# Patient Record
Sex: FEMALE | Race: AMERICAN INDIAN OR ALASKA NATIVE | NOT HISPANIC OR LATINO
[De-identification: names, ages, dates, MRNs, and addresses within clinical notes are randomized per-mention and may not be internally consistent; named-entity substitution may affect disease eponyms.]

---

## 2017-03-10 PROBLEM — Z00.00 ENCOUNTER FOR PREVENTIVE HEALTH EXAMINATION: Status: ACTIVE | Noted: 2017-03-10

## 2017-05-31 ENCOUNTER — APPOINTMENT (OUTPATIENT)
Dept: OBGYN | Facility: CLINIC | Age: 42
End: 2017-05-31

## 2017-05-31 VITALS — HEIGHT: 64 IN | WEIGHT: 135 LBS | BODY MASS INDEX: 23.05 KG/M2

## 2017-07-17 ENCOUNTER — APPOINTMENT (OUTPATIENT)
Dept: OBGYN | Facility: CLINIC | Age: 42
End: 2017-07-17
Payer: COMMERCIAL

## 2017-07-17 VITALS
BODY MASS INDEX: 22.36 KG/M2 | WEIGHT: 131 LBS | DIASTOLIC BLOOD PRESSURE: 74 MMHG | HEART RATE: 61 BPM | HEIGHT: 64 IN | SYSTOLIC BLOOD PRESSURE: 109 MMHG

## 2017-07-17 PROCEDURE — 99213 OFFICE O/P EST LOW 20 MIN: CPT

## 2017-07-17 PROCEDURE — 76830 TRANSVAGINAL US NON-OB: CPT

## 2017-07-17 PROCEDURE — 76857 US EXAM PELVIC LIMITED: CPT

## 2017-07-18 ENCOUNTER — APPOINTMENT (OUTPATIENT)
Dept: OBGYN | Facility: CLINIC | Age: 42
End: 2017-07-18

## 2017-07-19 ENCOUNTER — APPOINTMENT (OUTPATIENT)
Dept: OBGYN | Facility: CLINIC | Age: 42
End: 2017-07-19

## 2017-07-19 VITALS
DIASTOLIC BLOOD PRESSURE: 81 MMHG | BODY MASS INDEX: 22.53 KG/M2 | HEIGHT: 64 IN | WEIGHT: 132 LBS | HEART RATE: 72 BPM | SYSTOLIC BLOOD PRESSURE: 121 MMHG

## 2017-08-12 LAB
CORE LAB BIOPSY: NORMAL
FSH SERPL-MCNC: 3.8 IU/L
HCG SERPL-MCNC: <1 MIU/ML
T4 FREE SERPL-MCNC: 1.5 NG/DL
TSH SERPL-ACNC: 1.7 UIU/ML

## 2017-10-22 ENCOUNTER — OTHER (OUTPATIENT)
Age: 42
End: 2017-10-22

## 2017-10-30 ENCOUNTER — APPOINTMENT (OUTPATIENT)
Dept: OBGYN | Facility: CLINIC | Age: 42
End: 2017-10-30

## 2017-11-02 ENCOUNTER — OTHER (OUTPATIENT)
Age: 42
End: 2017-11-02

## 2017-12-16 LAB
FSH SERPL-MCNC: 4.9 IU/L
HCG SERPL-MCNC: <1 MIU/ML

## 2018-03-28 ENCOUNTER — APPOINTMENT (OUTPATIENT)
Dept: CT IMAGING | Facility: CLINIC | Age: 43
End: 2018-03-28

## 2018-03-28 ENCOUNTER — RECORD ABSTRACTING (OUTPATIENT)
Age: 43
End: 2018-03-28

## 2018-04-02 ENCOUNTER — APPOINTMENT (OUTPATIENT)
Dept: CT IMAGING | Facility: CLINIC | Age: 43
End: 2018-04-02

## 2018-04-03 ENCOUNTER — APPOINTMENT (OUTPATIENT)
Dept: GYNECOLOGIC ONCOLOGY | Facility: CLINIC | Age: 43
End: 2018-04-03
Payer: COMMERCIAL

## 2018-04-03 VITALS
DIASTOLIC BLOOD PRESSURE: 92 MMHG | WEIGHT: 135 LBS | OXYGEN SATURATION: 100 % | BODY MASS INDEX: 23.92 KG/M2 | SYSTOLIC BLOOD PRESSURE: 163 MMHG | HEIGHT: 63 IN | HEART RATE: 81 BPM

## 2018-04-03 DIAGNOSIS — N93.9 ABNORMAL UTERINE AND VAGINAL BLEEDING, UNSPECIFIED: ICD-10-CM

## 2018-04-03 DIAGNOSIS — N92.0 EXCESSIVE AND FREQUENT MENSTRUATION WITH REGULAR CYCLE: ICD-10-CM

## 2018-04-03 DIAGNOSIS — R97.1 ELEVATED CANCER ANTIGEN 125 [CA 125]: ICD-10-CM

## 2018-04-03 DIAGNOSIS — Z87.42 PERSONAL HISTORY OF OTHER DISEASES OF THE FEMALE GENITAL TRACT: ICD-10-CM

## 2018-04-03 LAB
HCG UR QL: NEGATIVE
QUALITY CONTROL: YES

## 2018-04-03 PROCEDURE — 99205 OFFICE O/P NEW HI 60 MIN: CPT | Mod: 25

## 2018-04-03 PROCEDURE — 81025 URINE PREGNANCY TEST: CPT

## 2018-04-03 PROCEDURE — 58100 BIOPSY OF UTERUS LINING: CPT

## 2018-04-03 RX ORDER — AZITHROMYCIN 250 MG/1
250 TABLET, FILM COATED ORAL
Qty: 6 | Refills: 0 | Status: COMPLETED | COMMUNITY
Start: 2018-01-10

## 2018-04-03 RX ORDER — FOLIC ACID 1 MG/1
1 TABLET ORAL
Qty: 30 | Refills: 0 | Status: ACTIVE | COMMUNITY
Start: 2018-01-29

## 2018-04-03 RX ORDER — NORETHINDRONE ACETATE 5 MG/1
5 TABLET ORAL
Qty: 50 | Refills: 0 | Status: COMPLETED | COMMUNITY
Start: 2018-01-29

## 2018-04-03 RX ORDER — CHLORHEXIDINE GLUCONATE 4 %
325 (65 FE) LIQUID (ML) TOPICAL
Qty: 100 | Refills: 0 | Status: ACTIVE | COMMUNITY
Start: 2018-01-29

## 2018-04-05 PROBLEM — Z87.42 HISTORY OF AMENORRHEA: Status: RESOLVED | Noted: 2017-10-22 | Resolved: 2018-04-05

## 2018-04-05 PROBLEM — N93.9 ABNORMAL BLEEDING IN MENSTRUAL CYCLE: Noted: 2017-07-17

## 2018-04-05 PROBLEM — N92.0 MENORRHAGIA: Noted: 2017-06-11

## 2018-04-05 LAB — CORE LAB BIOPSY: NORMAL

## 2018-04-13 ENCOUNTER — OTHER (OUTPATIENT)
Age: 43
End: 2018-04-13

## 2018-04-14 ENCOUNTER — APPOINTMENT (OUTPATIENT)
Dept: CT IMAGING | Facility: CLINIC | Age: 43
End: 2018-04-14
Payer: COMMERCIAL

## 2018-04-14 ENCOUNTER — OUTPATIENT (OUTPATIENT)
Dept: OUTPATIENT SERVICES | Facility: HOSPITAL | Age: 43
LOS: 1 days | End: 2018-04-14
Payer: MEDICAID

## 2018-04-14 DIAGNOSIS — Z00.8 ENCOUNTER FOR OTHER GENERAL EXAMINATION: ICD-10-CM

## 2018-04-14 PROCEDURE — 74177 CT ABD & PELVIS W/CONTRAST: CPT | Mod: 26

## 2018-04-14 PROCEDURE — 74177 CT ABD & PELVIS W/CONTRAST: CPT

## 2018-04-16 ENCOUNTER — TRANSCRIPTION ENCOUNTER (OUTPATIENT)
Age: 43
End: 2018-04-16

## 2018-04-21 ENCOUNTER — EMERGENCY (EMERGENCY)
Facility: HOSPITAL | Age: 43
LOS: 1 days | Discharge: HOME | End: 2018-04-21
Attending: EMERGENCY MEDICINE | Admitting: EMERGENCY MEDICINE
Payer: COMMERCIAL

## 2018-04-21 VITALS
OXYGEN SATURATION: 100 % | DIASTOLIC BLOOD PRESSURE: 74 MMHG | TEMPERATURE: 97 F | WEIGHT: 132.06 LBS | RESPIRATION RATE: 16 BRPM | SYSTOLIC BLOOD PRESSURE: 110 MMHG | HEART RATE: 85 BPM

## 2018-04-21 VITALS
TEMPERATURE: 98 F | RESPIRATION RATE: 16 BRPM | OXYGEN SATURATION: 98 % | DIASTOLIC BLOOD PRESSURE: 68 MMHG | HEART RATE: 78 BPM | SYSTOLIC BLOOD PRESSURE: 118 MMHG

## 2018-04-21 LAB
ALBUMIN SERPL ELPH-MCNC: 3.1 G/DL — LOW (ref 3.3–5)
ALP SERPL-CCNC: 64 U/L — SIGNIFICANT CHANGE UP (ref 40–120)
ALT FLD-CCNC: 34 U/L — SIGNIFICANT CHANGE UP (ref 12–78)
ANION GAP SERPL CALC-SCNC: 9 MMOL/L — SIGNIFICANT CHANGE UP (ref 5–17)
ANISOCYTOSIS BLD QL: SIGNIFICANT CHANGE UP
APPEARANCE UR: ABNORMAL
AST SERPL-CCNC: 37 U/L — SIGNIFICANT CHANGE UP (ref 15–37)
BACTERIA # UR AUTO: ABNORMAL
BASOPHILS # BLD AUTO: 0.1 K/UL — SIGNIFICANT CHANGE UP (ref 0–0.2)
BASOPHILS NFR BLD AUTO: 0.7 % — SIGNIFICANT CHANGE UP (ref 0–2)
BILIRUB SERPL-MCNC: 0.4 MG/DL — SIGNIFICANT CHANGE UP (ref 0.2–1.2)
BILIRUB UR-MCNC: NEGATIVE — SIGNIFICANT CHANGE UP
BUN SERPL-MCNC: 8 MG/DL — SIGNIFICANT CHANGE UP (ref 7–23)
CALCIUM SERPL-MCNC: 8.3 MG/DL — LOW (ref 8.5–10.1)
CHLORIDE SERPL-SCNC: 106 MMOL/L — SIGNIFICANT CHANGE UP (ref 96–108)
CO2 SERPL-SCNC: 24 MMOL/L — SIGNIFICANT CHANGE UP (ref 22–31)
COLOR SPEC: YELLOW — SIGNIFICANT CHANGE UP
CREAT SERPL-MCNC: 0.86 MG/DL — SIGNIFICANT CHANGE UP (ref 0.5–1.3)
DACRYOCYTES BLD QL SMEAR: SLIGHT — SIGNIFICANT CHANGE UP
DIFF PNL FLD: ABNORMAL
ELLIPTOCYTES BLD QL SMEAR: SLIGHT — SIGNIFICANT CHANGE UP
EOSINOPHIL # BLD AUTO: 0.1 K/UL — SIGNIFICANT CHANGE UP (ref 0–0.5)
EOSINOPHIL NFR BLD AUTO: 0.7 % — SIGNIFICANT CHANGE UP (ref 0–6)
EPI CELLS # UR: SIGNIFICANT CHANGE UP
GLUCOSE SERPL-MCNC: 167 MG/DL — HIGH (ref 70–99)
GLUCOSE UR QL: NEGATIVE — SIGNIFICANT CHANGE UP
HCG SERPL-ACNC: <1 MIU/ML — SIGNIFICANT CHANGE UP
HCT VFR BLD CALC: 33 % — LOW (ref 34.5–45)
HGB BLD-MCNC: 9.5 G/DL — LOW (ref 11.5–15.5)
HYPOCHROMIA BLD QL: SIGNIFICANT CHANGE UP
KETONES UR-MCNC: NEGATIVE — SIGNIFICANT CHANGE UP
LACTATE SERPL-SCNC: 0.8 MMOL/L — SIGNIFICANT CHANGE UP (ref 0.7–2)
LACTATE SERPL-SCNC: 2.4 MMOL/L — HIGH (ref 0.7–2)
LEUKOCYTE ESTERASE UR-ACNC: ABNORMAL
LG PLATELETS BLD QL AUTO: SLIGHT — SIGNIFICANT CHANGE UP
LYMPHOCYTES # BLD AUTO: 1.5 K/UL — SIGNIFICANT CHANGE UP (ref 1–3.3)
LYMPHOCYTES # BLD AUTO: 12.4 % — LOW (ref 13–44)
MANUAL DIF COMMENT BLD-IMP: SIGNIFICANT CHANGE UP
MCHC RBC-ENTMCNC: 15.6 PG — LOW (ref 27–34)
MCHC RBC-ENTMCNC: 28.8 GM/DL — LOW (ref 32–36)
MCV RBC AUTO: 54.2 FL — LOW (ref 80–100)
MICROCYTES BLD QL: SIGNIFICANT CHANGE UP
MONOCYTES # BLD AUTO: 1.1 K/UL — HIGH (ref 0–0.9)
MONOCYTES NFR BLD AUTO: 8.7 % — SIGNIFICANT CHANGE UP (ref 1–9)
NEUTROPHILS # BLD AUTO: 9.6 K/UL — HIGH (ref 1.8–7.4)
NEUTROPHILS NFR BLD AUTO: 77.5 % — HIGH (ref 43–77)
NITRITE UR-MCNC: NEGATIVE — SIGNIFICANT CHANGE UP
OVALOCYTES BLD QL SMEAR: SIGNIFICANT CHANGE UP
PH UR: 6.5 — SIGNIFICANT CHANGE UP (ref 5–8)
PLAT MORPH BLD: NORMAL — SIGNIFICANT CHANGE UP
PLATELET # BLD AUTO: 200 K/UL — SIGNIFICANT CHANGE UP (ref 150–400)
POIKILOCYTOSIS BLD QL AUTO: SIGNIFICANT CHANGE UP
POLYCHROMASIA BLD QL SMEAR: SLIGHT — SIGNIFICANT CHANGE UP
POTASSIUM SERPL-MCNC: 3.7 MMOL/L — SIGNIFICANT CHANGE UP (ref 3.5–5.3)
POTASSIUM SERPL-SCNC: 3.7 MMOL/L — SIGNIFICANT CHANGE UP (ref 3.5–5.3)
PROT SERPL-MCNC: 7.9 G/DL — SIGNIFICANT CHANGE UP (ref 6–8.3)
PROT UR-MCNC: 25 MG/DL
RBC # BLD: 6.09 M/UL — HIGH (ref 3.8–5.2)
RBC # FLD: 17 % — HIGH (ref 10.3–14.5)
RBC BLD AUTO: ABNORMAL
RBC CASTS # UR COMP ASSIST: ABNORMAL /HPF (ref 0–4)
SCHISTOCYTES BLD QL AUTO: SLIGHT — SIGNIFICANT CHANGE UP
SODIUM SERPL-SCNC: 139 MMOL/L — SIGNIFICANT CHANGE UP (ref 135–145)
SP GR SPEC: 1.01 — SIGNIFICANT CHANGE UP (ref 1.01–1.02)
UROBILINOGEN FLD QL: NEGATIVE — SIGNIFICANT CHANGE UP
WBC # BLD: 12.3 K/UL — HIGH (ref 3.8–10.5)
WBC # FLD AUTO: 12.3 K/UL — HIGH (ref 3.8–10.5)
WBC UR QL: ABNORMAL

## 2018-04-21 PROCEDURE — 74177 CT ABD & PELVIS W/CONTRAST: CPT

## 2018-04-21 PROCEDURE — 74177 CT ABD & PELVIS W/CONTRAST: CPT | Mod: 26

## 2018-04-21 PROCEDURE — 84702 CHORIONIC GONADOTROPIN TEST: CPT

## 2018-04-21 PROCEDURE — 83605 ASSAY OF LACTIC ACID: CPT

## 2018-04-21 PROCEDURE — 96365 THER/PROPH/DIAG IV INF INIT: CPT | Mod: XU

## 2018-04-21 PROCEDURE — 80053 COMPREHEN METABOLIC PANEL: CPT

## 2018-04-21 PROCEDURE — 96375 TX/PRO/DX INJ NEW DRUG ADDON: CPT

## 2018-04-21 PROCEDURE — 85027 COMPLETE CBC AUTOMATED: CPT

## 2018-04-21 PROCEDURE — 99284 EMERGENCY DEPT VISIT MOD MDM: CPT | Mod: 25

## 2018-04-21 PROCEDURE — 36415 COLL VENOUS BLD VENIPUNCTURE: CPT

## 2018-04-21 PROCEDURE — 96361 HYDRATE IV INFUSION ADD-ON: CPT

## 2018-04-21 PROCEDURE — 87086 URINE CULTURE/COLONY COUNT: CPT

## 2018-04-21 PROCEDURE — 99285 EMERGENCY DEPT VISIT HI MDM: CPT

## 2018-04-21 PROCEDURE — 81001 URINALYSIS AUTO W/SCOPE: CPT

## 2018-04-21 RX ORDER — SODIUM CHLORIDE 9 MG/ML
1000 INJECTION INTRAMUSCULAR; INTRAVENOUS; SUBCUTANEOUS ONCE
Qty: 0 | Refills: 0 | Status: COMPLETED | OUTPATIENT
Start: 2018-04-21 | End: 2018-04-21

## 2018-04-21 RX ORDER — PHENAZOPYRIDINE HCL 100 MG
1 TABLET ORAL
Qty: 6 | Refills: 0 | OUTPATIENT
Start: 2018-04-21 | End: 2018-04-22

## 2018-04-21 RX ORDER — KETOROLAC TROMETHAMINE 30 MG/ML
30 SYRINGE (ML) INJECTION ONCE
Qty: 0 | Refills: 0 | Status: DISCONTINUED | OUTPATIENT
Start: 2018-04-21 | End: 2018-04-21

## 2018-04-21 RX ORDER — ACETAMINOPHEN 500 MG
650 TABLET ORAL ONCE
Qty: 0 | Refills: 0 | Status: COMPLETED | OUTPATIENT
Start: 2018-04-21 | End: 2018-04-21

## 2018-04-21 RX ORDER — PHENAZOPYRIDINE HCL 100 MG
200 TABLET ORAL ONCE
Qty: 0 | Refills: 0 | Status: COMPLETED | OUTPATIENT
Start: 2018-04-21 | End: 2018-04-21

## 2018-04-21 RX ORDER — CEFUROXIME AXETIL 250 MG
1 TABLET ORAL
Qty: 20 | Refills: 0 | OUTPATIENT
Start: 2018-04-21 | End: 2018-04-30

## 2018-04-21 RX ORDER — IOHEXOL 300 MG/ML
30 INJECTION, SOLUTION INTRAVENOUS ONCE
Qty: 0 | Refills: 0 | Status: COMPLETED | OUTPATIENT
Start: 2018-04-21 | End: 2018-04-21

## 2018-04-21 RX ORDER — CEFTRIAXONE 500 MG/1
1 INJECTION, POWDER, FOR SOLUTION INTRAMUSCULAR; INTRAVENOUS ONCE
Qty: 0 | Refills: 0 | Status: COMPLETED | OUTPATIENT
Start: 2018-04-21 | End: 2018-04-21

## 2018-04-21 RX ADMIN — Medication 30 MILLIGRAM(S): at 11:24

## 2018-04-21 RX ADMIN — Medication 650 MILLIGRAM(S): at 10:55

## 2018-04-21 RX ADMIN — IOHEXOL 30 MILLILITER(S): 300 INJECTION, SOLUTION INTRAVENOUS at 11:06

## 2018-04-21 RX ADMIN — SODIUM CHLORIDE 1000 MILLILITER(S): 9 INJECTION INTRAMUSCULAR; INTRAVENOUS; SUBCUTANEOUS at 10:14

## 2018-04-21 RX ADMIN — Medication 200 MILLIGRAM(S): at 10:54

## 2018-04-21 RX ADMIN — CEFTRIAXONE 100 GRAM(S): 500 INJECTION, POWDER, FOR SOLUTION INTRAMUSCULAR; INTRAVENOUS at 10:55

## 2018-04-21 RX ADMIN — SODIUM CHLORIDE 1000 MILLILITER(S): 9 INJECTION INTRAMUSCULAR; INTRAVENOUS; SUBCUTANEOUS at 11:26

## 2018-04-21 RX ADMIN — Medication 30 MILLIGRAM(S): at 10:55

## 2018-04-21 RX ADMIN — SODIUM CHLORIDE 1000 MILLILITER(S): 9 INJECTION INTRAMUSCULAR; INTRAVENOUS; SUBCUTANEOUS at 12:07

## 2018-04-21 NOTE — ED PROVIDER NOTE - PROGRESS NOTE DETAILS
pt refused CT of ab and pelvis c IV contrast All results were explained to patient and/or family and a copy of all available results given.  pt felt better.

## 2018-04-21 NOTE — ED PROVIDER NOTE - OBJECTIVE STATEMENT
lower ab pain x 4 days.  n/v x 1 this past Wed.  no diarrhea.  normal bm prior to ED arrival.  lmp-3/8/18.  Fever 100 last night.  HO large fibroid from ct scan 1 week ago.  plan for hysterectomy this coming May.  Fever started this past Thursday c chills.  urinary urgency and dysuria, urinary frequency.  no other complaint.

## 2018-04-22 LAB
CULTURE RESULTS: SIGNIFICANT CHANGE UP
SPECIMEN SOURCE: SIGNIFICANT CHANGE UP

## 2018-04-26 ENCOUNTER — APPOINTMENT (OUTPATIENT)
Dept: GYNECOLOGIC ONCOLOGY | Facility: CLINIC | Age: 43
End: 2018-04-26

## 2018-04-30 ENCOUNTER — APPOINTMENT (OUTPATIENT)
Dept: MRI IMAGING | Facility: CLINIC | Age: 43
End: 2018-04-30

## 2018-05-07 ENCOUNTER — OUTPATIENT (OUTPATIENT)
Dept: OUTPATIENT SERVICES | Facility: HOSPITAL | Age: 43
LOS: 1 days | End: 2018-05-07

## 2018-05-07 VITALS
RESPIRATION RATE: 18 BRPM | TEMPERATURE: 99 F | DIASTOLIC BLOOD PRESSURE: 80 MMHG | SYSTOLIC BLOOD PRESSURE: 140 MMHG | HEART RATE: 77 BPM | OXYGEN SATURATION: 98 % | HEIGHT: 62.5 IN | WEIGHT: 128.97 LBS

## 2018-05-07 DIAGNOSIS — N93.9 ABNORMAL UTERINE AND VAGINAL BLEEDING, UNSPECIFIED: ICD-10-CM

## 2018-05-07 DIAGNOSIS — D25.9 LEIOMYOMA OF UTERUS, UNSPECIFIED: ICD-10-CM

## 2018-05-07 LAB
APPEARANCE UR: CLEAR — SIGNIFICANT CHANGE UP
BILIRUB UR-MCNC: NEGATIVE — SIGNIFICANT CHANGE UP
BLD GP AB SCN SERPL QL: NEGATIVE — SIGNIFICANT CHANGE UP
BLOOD UR QL VISUAL: NEGATIVE — SIGNIFICANT CHANGE UP
BUN SERPL-MCNC: 16 MG/DL — SIGNIFICANT CHANGE UP (ref 7–23)
CALCIUM SERPL-MCNC: 9.1 MG/DL — SIGNIFICANT CHANGE UP (ref 8.4–10.5)
CHLORIDE SERPL-SCNC: 99 MMOL/L — SIGNIFICANT CHANGE UP (ref 98–107)
CO2 SERPL-SCNC: 26 MMOL/L — SIGNIFICANT CHANGE UP (ref 22–31)
COLOR SPEC: SIGNIFICANT CHANGE UP
CREAT SERPL-MCNC: 1.1 MG/DL — SIGNIFICANT CHANGE UP (ref 0.5–1.3)
GLUCOSE SERPL-MCNC: 98 MG/DL — SIGNIFICANT CHANGE UP (ref 70–99)
GLUCOSE UR-MCNC: NEGATIVE — SIGNIFICANT CHANGE UP
HBA1C BLD-MCNC: 4.8 % — SIGNIFICANT CHANGE UP (ref 4–5.6)
HCG SERPL-ACNC: < 5 MIU/ML — SIGNIFICANT CHANGE UP
HCT VFR BLD CALC: 31.9 % — LOW (ref 34.5–45)
HGB BLD-MCNC: 9.1 G/DL — LOW (ref 11.5–15.5)
KETONES UR-MCNC: NEGATIVE — SIGNIFICANT CHANGE UP
LEUKOCYTE ESTERASE UR-ACNC: NEGATIVE — SIGNIFICANT CHANGE UP
MCHC RBC-ENTMCNC: 15.7 PG — LOW (ref 27–34)
MCHC RBC-ENTMCNC: 28.5 % — LOW (ref 32–36)
MCV RBC AUTO: 55 FL — LOW (ref 80–100)
NITRITE UR-MCNC: NEGATIVE — SIGNIFICANT CHANGE UP
NRBC # FLD: 0 — SIGNIFICANT CHANGE UP
PH UR: 6.5 — SIGNIFICANT CHANGE UP (ref 4.6–8)
PLATELET # BLD AUTO: 328 K/UL — SIGNIFICANT CHANGE UP (ref 150–400)
PMV BLD: SIGNIFICANT CHANGE UP FL (ref 7–13)
POTASSIUM SERPL-MCNC: 3.9 MMOL/L — SIGNIFICANT CHANGE UP (ref 3.5–5.3)
POTASSIUM SERPL-SCNC: 3.9 MMOL/L — SIGNIFICANT CHANGE UP (ref 3.5–5.3)
PROT UR-MCNC: NEGATIVE MG/DL — SIGNIFICANT CHANGE UP
RBC # BLD: 5.8 M/UL — HIGH (ref 3.8–5.2)
RBC # FLD: 25.1 % — HIGH (ref 10.3–14.5)
RBC CASTS # UR COMP ASSIST: SIGNIFICANT CHANGE UP (ref 0–?)
RH IG SCN BLD-IMP: POSITIVE — SIGNIFICANT CHANGE UP
SODIUM SERPL-SCNC: 139 MMOL/L — SIGNIFICANT CHANGE UP (ref 135–145)
SP GR SPEC: 1.01 — SIGNIFICANT CHANGE UP (ref 1–1.04)
SQUAMOUS # UR AUTO: SIGNIFICANT CHANGE UP
UROBILINOGEN FLD QL: NORMAL MG/DL — SIGNIFICANT CHANGE UP
WBC # BLD: 8.21 K/UL — SIGNIFICANT CHANGE UP (ref 3.8–10.5)
WBC # FLD AUTO: 8.21 K/UL — SIGNIFICANT CHANGE UP (ref 3.8–10.5)
WBC UR QL: SIGNIFICANT CHANGE UP (ref 0–?)

## 2018-05-07 RX ORDER — SODIUM CHLORIDE 9 MG/ML
1000 INJECTION, SOLUTION INTRAVENOUS
Qty: 0 | Refills: 0 | Status: DISCONTINUED | OUTPATIENT
Start: 2018-05-11 | End: 2018-05-12

## 2018-05-07 RX ORDER — SODIUM CHLORIDE 9 MG/ML
3 INJECTION INTRAMUSCULAR; INTRAVENOUS; SUBCUTANEOUS EVERY 8 HOURS
Qty: 0 | Refills: 0 | Status: DISCONTINUED | OUTPATIENT
Start: 2018-05-11 | End: 2018-05-14

## 2018-05-07 NOTE — H&P PST ADULT - PMH
Abnormal uterine and vaginal bleeding    Elevated CA-125    Uterine fibroid    UTI (urinary tract infection)

## 2018-05-07 NOTE — H&P PST ADULT - PROBLEM SELECTOR PLAN 1
Scheduled for Exploratory Laparotomy, Bilateral Salpingectomy, Possible Bilateral Oophorectomy, Possible Staging, Cystoscopy on 5/11/2018.  Preop instructions given, pt verbalized understanding   Chlorhexidine wash and GI prophylaxis provided

## 2018-05-07 NOTE — H&P PST ADULT - NSANTHOSAYNRD_GEN_A_CORE
No. VIJAYA screening performed.  STOP BANG Legend: 0-2 = LOW Risk; 3-4 = INTERMEDIATE Risk; 5-8 = HIGH Risk

## 2018-05-07 NOTE — H&P PST ADULT - NEGATIVE BREAST SYMPTOMS
no nipple discharge R/no breast lump R/no breast lump L/no nipple discharge L/no breast tenderness L/no breast tenderness R

## 2018-05-07 NOTE — H&P PST ADULT - HISTORY OF PRESENT ILLNESS
41 y/o female  with h/o uterine fibroids presents to PST for preoperative evaluation with dx leiomyoma of uterus, abnormal uterine/vaginal bleeding and elevated cancer antigen 125 level. h/o menorrhagia and uterine fibroid for 1 year. Pt recently presented to the ED on 4/21/2018 reporting pelvic pain for several days. s/p CT of abdomen and pelvis noted large myomatous uterus and left adnexal cyst which was unchanged from previous studies. Scheduled for Exploratory Laparotomy, Bilateral Salpingectomy, Possible Bilateral Oophorectomy, Possible Staging, Cystoscopy on 5/11/2018. Pt report endometrial biopsy results were negative. She was treated for UTI and d/c home with oral antibiotics. 41 y/o female  with h/o uterine fibroids presents to PST for preoperative evaluation with dx leiomyoma of uterus, abnormal uterine/vaginal bleeding and elevated cancer antigen 125 level. h/o menorrhagia and uterine fibroid for 1 year. Pt recently presented to the ED on 4/21/2018 reporting pelvic pain for several days. s/p CT of abdomen and pelvis noted large myomatous uterus and left adnexal cyst which has unchanged from previous studies. Scheduled for Exploratory Laparotomy, Bilateral Salpingectomy, Possible Bilateral Oophorectomy, Possible Staging, Cystoscopy on 5/11/2018. Pt report endometrial biopsy results were negative. She was treated for UTI and d/c home with oral antibiotics for 7 days. 43 y/o female  with h/o uterine fibroids presents to PST for preoperative evaluation with dx leiomyoma of uterus, abnormal uterine/vaginal bleeding and elevated cancer antigen 125 level. h/o menorrhagia and uterine fibroid for 1 year. Pt recently presented to the ED on 4/21/2018 reporting pelvic pain for several days. s/p CT of abdomen and pelvis noted large myomatous uterus and left adnexal cyst which has been unchanged from previous studies. Scheduled for Exploratory Laparotomy, Bilateral Salpingectomy, Possible Bilateral Oophorectomy, Possible Staging, Cystoscopy on 5/11/2018. Pt report endometrial biopsy results were negative. She was treated for UTI and d/c home with oral antibiotics for 7 days.

## 2018-05-07 NOTE — H&P PST ADULT - NEGATIVE NEUROLOGICAL SYMPTOMS
no syncope/no vertigo/no loss of consciousness/no paresthesias/no headache/no confusion/no weakness/no generalized seizures/no focal seizures/no tremors/no hemiparesis/no facial palsy/no transient paralysis/no loss of sensation/no difficulty walking

## 2018-05-07 NOTE — H&P PST ADULT - NEGATIVE CARDIOVASCULAR SYMPTOMS
no claudication/no chest pain/no peripheral edema/no palpitations/no dyspnea on exertion/no orthopnea/no paroxysmal nocturnal dyspnea

## 2018-05-07 NOTE — H&P PST ADULT - NEGATIVE GENERAL GENITOURINARY SYMPTOMS
no flank pain L/no flank pain R/no dysuria/no hematuria/normal urinary frequency/no urinary hesitancy/no incontinence

## 2018-05-09 LAB
BACTERIA UR CULT: SIGNIFICANT CHANGE UP
SPECIMEN SOURCE: SIGNIFICANT CHANGE UP

## 2018-05-10 ENCOUNTER — TRANSCRIPTION ENCOUNTER (OUTPATIENT)
Age: 43
End: 2018-05-10

## 2018-05-11 ENCOUNTER — INPATIENT (INPATIENT)
Facility: HOSPITAL | Age: 43
LOS: 2 days | Discharge: HOME | End: 2018-05-14
Attending: OBSTETRICS & GYNECOLOGY | Admitting: OBSTETRICS & GYNECOLOGY
Payer: COMMERCIAL

## 2018-05-11 ENCOUNTER — RESULT REVIEW (OUTPATIENT)
Age: 43
End: 2018-05-11

## 2018-05-11 ENCOUNTER — APPOINTMENT (OUTPATIENT)
Dept: GYNECOLOGIC ONCOLOGY | Facility: HOSPITAL | Age: 43
End: 2018-05-11

## 2018-05-11 VITALS
HEIGHT: 62.5 IN | SYSTOLIC BLOOD PRESSURE: 134 MMHG | OXYGEN SATURATION: 100 % | HEART RATE: 78 BPM | RESPIRATION RATE: 16 BRPM | WEIGHT: 128.97 LBS | TEMPERATURE: 98 F | DIASTOLIC BLOOD PRESSURE: 84 MMHG

## 2018-05-11 DIAGNOSIS — N93.9 ABNORMAL UTERINE AND VAGINAL BLEEDING, UNSPECIFIED: ICD-10-CM

## 2018-05-11 DIAGNOSIS — N61.1 ABSCESS OF THE BREAST AND NIPPLE: Chronic | ICD-10-CM

## 2018-05-11 LAB
ANISOCYTOSIS BLD QL: SIGNIFICANT CHANGE UP
BASE EXCESS BLDA CALC-SCNC: -0.9 MMOL/L — SIGNIFICANT CHANGE UP
BASE EXCESS BLDA CALC-SCNC: -3.1 MMOL/L — SIGNIFICANT CHANGE UP
BASOPHILS # BLD AUTO: 0.02 K/UL — SIGNIFICANT CHANGE UP (ref 0–0.2)
BASOPHILS NFR BLD AUTO: 0.1 % — SIGNIFICANT CHANGE UP (ref 0–2)
BASOPHILS NFR SPEC: 0 % — SIGNIFICANT CHANGE UP (ref 0–2)
BLASTS # FLD: 0 % — SIGNIFICANT CHANGE UP (ref 0–0)
BUN SERPL-MCNC: 9 MG/DL — SIGNIFICANT CHANGE UP (ref 7–23)
CA-I BLDA-SCNC: 1.08 MMOL/L — LOW (ref 1.15–1.29)
CA-I BLDA-SCNC: 1.16 MMOL/L — SIGNIFICANT CHANGE UP (ref 1.15–1.29)
CALCIUM SERPL-MCNC: 8.1 MG/DL — LOW (ref 8.4–10.5)
CHLORIDE SERPL-SCNC: 100 MMOL/L — SIGNIFICANT CHANGE UP (ref 98–107)
CO2 SERPL-SCNC: 26 MMOL/L — SIGNIFICANT CHANGE UP (ref 22–31)
CREAT SERPL-MCNC: 0.69 MG/DL — SIGNIFICANT CHANGE UP (ref 0.5–1.3)
DACRYOCYTES BLD QL SMEAR: SLIGHT — SIGNIFICANT CHANGE UP
ELLIPTOCYTES BLD QL SMEAR: SLIGHT — SIGNIFICANT CHANGE UP
EOSINOPHIL # BLD AUTO: 0 K/UL — SIGNIFICANT CHANGE UP (ref 0–0.5)
EOSINOPHIL NFR BLD AUTO: 0 % — SIGNIFICANT CHANGE UP (ref 0–6)
EOSINOPHIL NFR FLD: 0 % — SIGNIFICANT CHANGE UP (ref 0–6)
GIANT PLATELETS BLD QL SMEAR: PRESENT — SIGNIFICANT CHANGE UP
GLUCOSE BLDA-MCNC: 112 MG/DL — HIGH (ref 70–99)
GLUCOSE BLDA-MCNC: 133 MG/DL — HIGH (ref 70–99)
GLUCOSE BLDC GLUCOMTR-MCNC: 100 MG/DL — HIGH (ref 70–99)
GLUCOSE SERPL-MCNC: 155 MG/DL — HIGH (ref 70–99)
HCG UR QL: NEGATIVE — SIGNIFICANT CHANGE UP
HCO3 BLDA-SCNC: 22 MMOL/L — SIGNIFICANT CHANGE UP (ref 22–26)
HCO3 BLDA-SCNC: 24 MMOL/L — SIGNIFICANT CHANGE UP (ref 22–26)
HCT VFR BLD CALC: 30.2 % — LOW (ref 34.5–45)
HCT VFR BLDA CALC: 22.9 % — LOW (ref 34.5–46.5)
HCT VFR BLDA CALC: 23.4 % — LOW (ref 34.5–46.5)
HGB BLD-MCNC: 8.8 G/DL — LOW (ref 11.5–15.5)
HGB BLDA-MCNC: 7.3 G/DL — LOW (ref 11.5–15.5)
HGB BLDA-MCNC: 7.5 G/DL — LOW (ref 11.5–15.5)
HYPOCHROMIA BLD QL: SIGNIFICANT CHANGE UP
IMM GRANULOCYTES # BLD AUTO: 0.06 # — SIGNIFICANT CHANGE UP
IMM GRANULOCYTES NFR BLD AUTO: 0.4 % — SIGNIFICANT CHANGE UP (ref 0–1.5)
LYMPHOCYTES # BLD AUTO: 0.53 K/UL — LOW (ref 1–3.3)
LYMPHOCYTES # BLD AUTO: 3.6 % — LOW (ref 13–44)
LYMPHOCYTES NFR SPEC AUTO: 0.9 % — LOW (ref 13–44)
MCHC RBC-ENTMCNC: 15.8 PG — LOW (ref 27–34)
MCHC RBC-ENTMCNC: 29.1 % — LOW (ref 32–36)
MCV RBC AUTO: 54.3 FL — LOW (ref 80–100)
METAMYELOCYTES # FLD: 1.7 % — HIGH (ref 0–1)
MICROCYTES BLD QL: SIGNIFICANT CHANGE UP
MONOCYTES # BLD AUTO: 0.46 K/UL — SIGNIFICANT CHANGE UP (ref 0–0.9)
MONOCYTES NFR BLD AUTO: 3.1 % — SIGNIFICANT CHANGE UP (ref 2–14)
MONOCYTES NFR BLD: 1.7 % — LOW (ref 2–9)
MYELOCYTES NFR BLD: 0 % — SIGNIFICANT CHANGE UP (ref 0–0)
NEUTROPHIL AB SER-ACNC: 88.7 % — HIGH (ref 43–77)
NEUTROPHILS # BLD AUTO: 13.56 K/UL — HIGH (ref 1.8–7.4)
NEUTROPHILS NFR BLD AUTO: 92.8 % — HIGH (ref 43–77)
NEUTS BAND # BLD: 7 % — HIGH (ref 0–6)
NRBC # FLD: 0 — SIGNIFICANT CHANGE UP
OTHER - HEMATOLOGY %: 0 — SIGNIFICANT CHANGE UP
OVALOCYTES BLD QL SMEAR: SLIGHT — SIGNIFICANT CHANGE UP
PCO2 BLDA: 33 MMHG — SIGNIFICANT CHANGE UP (ref 32–48)
PCO2 BLDA: 36 MMHG — SIGNIFICANT CHANGE UP (ref 32–48)
PH BLDA: 7.42 PH — SIGNIFICANT CHANGE UP (ref 7.35–7.45)
PH BLDA: 7.43 PH — SIGNIFICANT CHANGE UP (ref 7.35–7.45)
PLATELET # BLD AUTO: 234 K/UL — SIGNIFICANT CHANGE UP (ref 150–400)
PLATELET COUNT - ESTIMATE: NORMAL — SIGNIFICANT CHANGE UP
PMV BLD: SIGNIFICANT CHANGE UP FL (ref 7–13)
PO2 BLDA: 290 MMHG — HIGH (ref 83–108)
PO2 BLDA: 314 MMHG — HIGH (ref 83–108)
POIKILOCYTOSIS BLD QL AUTO: SIGNIFICANT CHANGE UP
POLYCHROMASIA BLD QL SMEAR: SLIGHT — SIGNIFICANT CHANGE UP
POTASSIUM BLDA-SCNC: 2.9 MMOL/L — LOW (ref 3.4–4.5)
POTASSIUM BLDA-SCNC: 3.5 MMOL/L — SIGNIFICANT CHANGE UP (ref 3.4–4.5)
POTASSIUM SERPL-MCNC: 3.5 MMOL/L — SIGNIFICANT CHANGE UP (ref 3.5–5.3)
POTASSIUM SERPL-SCNC: 3.5 MMOL/L — SIGNIFICANT CHANGE UP (ref 3.5–5.3)
PROMYELOCYTES # FLD: 0 % — SIGNIFICANT CHANGE UP (ref 0–0)
RBC # BLD: 5.56 M/UL — HIGH (ref 3.8–5.2)
RBC # FLD: 24 % — HIGH (ref 10.3–14.5)
SAO2 % BLDA: 98.9 % — SIGNIFICANT CHANGE UP (ref 95–99)
SAO2 % BLDA: 99 % — SIGNIFICANT CHANGE UP (ref 95–99)
SCHISTOCYTES BLD QL AUTO: SLIGHT — SIGNIFICANT CHANGE UP
SODIUM BLDA-SCNC: 137 MMOL/L — SIGNIFICANT CHANGE UP (ref 136–146)
SODIUM BLDA-SCNC: 140 MMOL/L — SIGNIFICANT CHANGE UP (ref 136–146)
SODIUM SERPL-SCNC: 138 MMOL/L — SIGNIFICANT CHANGE UP (ref 135–145)
VARIANT LYMPHS # BLD: 0 % — SIGNIFICANT CHANGE UP
WBC # BLD: 14.63 K/UL — HIGH (ref 3.8–10.5)
WBC # FLD AUTO: 14.63 K/UL — HIGH (ref 3.8–10.5)

## 2018-05-11 PROCEDURE — 88302 TISSUE EXAM BY PATHOLOGIST: CPT | Mod: 26

## 2018-05-11 PROCEDURE — 58150 TOTAL HYSTERECTOMY: CPT

## 2018-05-11 PROCEDURE — 49203: CPT

## 2018-05-11 PROCEDURE — 88305 TISSUE EXAM BY PATHOLOGIST: CPT | Mod: 26

## 2018-05-11 PROCEDURE — 88307 TISSUE EXAM BY PATHOLOGIST: CPT | Mod: 26

## 2018-05-11 PROCEDURE — 88331 PATH CONSLTJ SURG 1 BLK 1SPC: CPT | Mod: 26

## 2018-05-11 PROCEDURE — 44955 APPENDECTOMY ADD-ON: CPT

## 2018-05-11 RX ORDER — ONDANSETRON 8 MG/1
4 TABLET, FILM COATED ORAL EVERY 6 HOURS
Qty: 0 | Refills: 0 | Status: DISCONTINUED | OUTPATIENT
Start: 2018-05-11 | End: 2018-05-11

## 2018-05-11 RX ORDER — NALOXONE HYDROCHLORIDE 4 MG/.1ML
0.1 SPRAY NASAL
Qty: 0 | Refills: 0 | Status: DISCONTINUED | OUTPATIENT
Start: 2018-05-11 | End: 2018-05-11

## 2018-05-11 RX ORDER — ONDANSETRON 8 MG/1
4 TABLET, FILM COATED ORAL ONCE
Qty: 0 | Refills: 0 | Status: DISCONTINUED | OUTPATIENT
Start: 2018-05-11 | End: 2018-05-12

## 2018-05-11 RX ORDER — ONDANSETRON 8 MG/1
4 TABLET, FILM COATED ORAL EVERY 6 HOURS
Qty: 0 | Refills: 0 | Status: DISCONTINUED | OUTPATIENT
Start: 2018-05-11 | End: 2018-05-14

## 2018-05-11 RX ORDER — ALBUMIN HUMAN 25 %
250 VIAL (ML) INTRAVENOUS ONCE
Qty: 0 | Refills: 0 | Status: COMPLETED | OUTPATIENT
Start: 2018-05-11 | End: 2018-05-11

## 2018-05-11 RX ORDER — SODIUM CHLORIDE 9 MG/ML
1000 INJECTION, SOLUTION INTRAVENOUS
Qty: 0 | Refills: 0 | Status: DISCONTINUED | OUTPATIENT
Start: 2018-05-11 | End: 2018-05-13

## 2018-05-11 RX ORDER — POTASSIUM CHLORIDE 20 MEQ
10 PACKET (EA) ORAL
Qty: 0 | Refills: 0 | Status: COMPLETED | OUTPATIENT
Start: 2018-05-11 | End: 2018-05-11

## 2018-05-11 RX ORDER — CEFUROXIME AXETIL 250 MG
1 TABLET ORAL
Qty: 0 | Refills: 0 | COMMUNITY

## 2018-05-11 RX ORDER — ACETAMINOPHEN 500 MG
1000 TABLET ORAL ONCE
Qty: 0 | Refills: 0 | Status: DISCONTINUED | OUTPATIENT
Start: 2018-05-11 | End: 2018-05-11

## 2018-05-11 RX ORDER — HEPARIN SODIUM 5000 [USP'U]/ML
5000 INJECTION INTRAVENOUS; SUBCUTANEOUS ONCE
Qty: 0 | Refills: 0 | Status: COMPLETED | OUTPATIENT
Start: 2018-05-11 | End: 2018-05-11

## 2018-05-11 RX ORDER — NALOXONE HYDROCHLORIDE 4 MG/.1ML
0.1 SPRAY NASAL
Qty: 0 | Refills: 0 | Status: DISCONTINUED | OUTPATIENT
Start: 2018-05-11 | End: 2018-05-13

## 2018-05-11 RX ORDER — DOCUSATE SODIUM 100 MG
100 CAPSULE ORAL THREE TIMES A DAY
Qty: 0 | Refills: 0 | Status: DISCONTINUED | OUTPATIENT
Start: 2018-05-11 | End: 2018-05-12

## 2018-05-11 RX ORDER — KETOROLAC TROMETHAMINE 30 MG/ML
30 SYRINGE (ML) INJECTION ONCE
Qty: 0 | Refills: 0 | Status: DISCONTINUED | OUTPATIENT
Start: 2018-05-11 | End: 2018-05-11

## 2018-05-11 RX ORDER — IBUPROFEN 200 MG
1 TABLET ORAL
Qty: 0 | Refills: 0 | COMMUNITY

## 2018-05-11 RX ORDER — METOCLOPRAMIDE HCL 10 MG
10 TABLET ORAL ONCE
Qty: 0 | Refills: 0 | Status: COMPLETED | OUTPATIENT
Start: 2018-05-11 | End: 2018-05-11

## 2018-05-11 RX ORDER — KETOROLAC TROMETHAMINE 30 MG/ML
30 SYRINGE (ML) INJECTION EVERY 6 HOURS
Qty: 0 | Refills: 0 | Status: DISCONTINUED | OUTPATIENT
Start: 2018-05-11 | End: 2018-05-13

## 2018-05-11 RX ORDER — ONDANSETRON 8 MG/1
4 TABLET, FILM COATED ORAL ONCE
Qty: 0 | Refills: 0 | Status: DISCONTINUED | OUTPATIENT
Start: 2018-05-11 | End: 2018-05-11

## 2018-05-11 RX ORDER — HEPARIN SODIUM 5000 [USP'U]/ML
5000 INJECTION INTRAVENOUS; SUBCUTANEOUS EVERY 12 HOURS
Qty: 0 | Refills: 0 | Status: DISCONTINUED | OUTPATIENT
Start: 2018-05-11 | End: 2018-05-13

## 2018-05-11 RX ADMIN — SODIUM CHLORIDE 125 MILLILITER(S): 9 INJECTION, SOLUTION INTRAVENOUS at 21:17

## 2018-05-11 RX ADMIN — SODIUM CHLORIDE 3 MILLILITER(S): 9 INJECTION INTRAMUSCULAR; INTRAVENOUS; SUBCUTANEOUS at 13:12

## 2018-05-11 RX ADMIN — HEPARIN SODIUM 5000 UNIT(S): 5000 INJECTION INTRAVENOUS; SUBCUTANEOUS at 06:14

## 2018-05-11 RX ADMIN — SODIUM CHLORIDE 3 MILLILITER(S): 9 INJECTION INTRAMUSCULAR; INTRAVENOUS; SUBCUTANEOUS at 21:59

## 2018-05-11 RX ADMIN — Medication 100 MILLIEQUIVALENT(S): at 20:19

## 2018-05-11 RX ADMIN — Medication 100 MILLIEQUIVALENT(S): at 22:34

## 2018-05-11 RX ADMIN — Medication 10 MILLIGRAM(S): at 22:50

## 2018-05-11 RX ADMIN — SODIUM CHLORIDE 125 MILLILITER(S): 9 INJECTION, SOLUTION INTRAVENOUS at 12:00

## 2018-05-11 RX ADMIN — Medication 125 MILLILITER(S): at 12:12

## 2018-05-11 RX ADMIN — HEPARIN SODIUM 5000 UNIT(S): 5000 INJECTION INTRAVENOUS; SUBCUTANEOUS at 18:07

## 2018-05-11 RX ADMIN — Medication 100 MILLIEQUIVALENT(S): at 21:17

## 2018-05-11 RX ADMIN — ONDANSETRON 4 MILLIGRAM(S): 8 TABLET, FILM COATED ORAL at 18:07

## 2018-05-11 NOTE — CHART NOTE - NSCHARTNOTEFT_GEN_A_CORE
PA POST OP NOTE:       SUBJECTIVE:  Patient seen and examined at bed side. Patient is awake and resting comfortably. Reports mild pain at this time. Denies c/o nausea, vomiting, sob, cp or palpitations.    Vital Signs Last 24 Hrs  T(C): 36.9 (11 May 2018 15:01), Max: 36.9 (11 May 2018 15:01)  T(F): 98.5 (11 May 2018 15:01), Max: 98.5 (11 May 2018 15:01)  HR: 64 (11 May 2018 15:01) (55 - 78)  BP: 119/75 (11 May 2018 15:01) (93/49 - 134/84)  BP(mean): 83 (11 May 2018 13:45) (80 - 89)  RR: 16 (11 May 2018 15:01) (12 - 20)  SpO2: 100% (11 May 2018 15:01) (100% - 100%)      PHYSICAL EXAM:    LUNGS: Clear to auscultation bilaterally; No wheezing.  HEART: Regular, rate and rhythm; No murmurs.  ABDOMEN: Soft, decreased BS, nondistended and appropriately tender on palpation.  INCISION: Dressing clean, dry and intact.  EXTREMITIES: No swelling or calf tenderness bilaterally. SCD's in place.  AUGUSTINE: Urine clear.      MEDICATIONS  (STANDING):  acetaminophen  IVPB. 1000 milliGRAM(s) IV Intermittent once  heparin  Injectable 5000 Unit(s) SubCutaneous every 12 hours  HYDROmorphone (10 MICROgram(s)/mL) + BUpivacaine 0.0625% in 0.9% Sodium Chloride PCEA 250 milliLiter(s) Epidural PCA Continuous  lactated ringers. 1000 milliLiter(s) (125 mL/Hr) IV Continuous <Continuous>  lactated ringers. 1000 milliLiter(s) (30 mL/Hr) IV Continuous <Continuous>  sodium chloride 0.9% lock flush 3 milliLiter(s) IV Push every 8 hours    MEDICATIONS  (PRN):  docusate sodium 100 milliGRAM(s) Oral three times a day PRN Stool Softening  HYDROmorphone (10 MICROgram(s)/mL) + BUpivacaine 0.0625% in 0.9% Sodium Chloride PCEA Rescue Clinician  Bolus 5 milliLiter(s) Epidural every 15 minutes PRN for Pain Score greater than 6  ketorolac   Injectable 30 milliGRAM(s) IV Push once PRN Moderate Pain  ketorolac   Injectable 30 milliGRAM(s) IV Push every 6 hours PRN Moderate Pain  naloxone Injectable 0.1 milliGRAM(s) IV Push every 3 minutes PRN For ANY of the following changes in patient status:  A. RR LESS THAN 10 breaths per minute, B. Oxygen saturation LESS THAN 90%, C. Sedation score of 6  ondansetron Injectable 4 milliGRAM(s) IV Push once PRN Postoperative Nausea and/or Vomiting  ondansetron Injectable 4 milliGRAM(s) IV Push once PRN Nausea and/or Vomiting  ondansetron Injectable 4 milliGRAM(s) IV Push every 6 hours PRN Nausea      ASSESMENT/PLAN:43y/o POD#0 from Ex Lap JOSÉ MIGUEL,BS,APPY in stable condition.    DIET: Clear as nikia  IVF: RL @125cc/hr.  ACTIVITY:  Out of bed with assist.  VS: Q routine  LABS: CBC+BMP at 3PM and in AM  AUGUSTINE: To leg bag drainage.  Continue PCEA as ordered.

## 2018-05-11 NOTE — BRIEF OPERATIVE NOTE - POST-OP DX
Adenomyosis  05/11/2018    Active  Gus Reyes  Uterine leiomyoma, unspecified location  05/11/2018    Active  Gus Reyes

## 2018-05-11 NOTE — BRIEF OPERATIVE NOTE - PROCEDURE
<<-----Click on this checkbox to enter Procedure Lysis of adhesions  05/11/2018    Active  ELLE  Appendectomy  05/11/2018    Active  ELLE  Total abdominal hysterectomy with bilateral salpingectomy  05/11/2018    Active  ELLE  Exploratory laparotomy  05/11/2018    Active  ELLE

## 2018-05-11 NOTE — BRIEF OPERATIVE NOTE - OPERATION/FINDINGS
9 cm fundal fibroid, 3cm bladder peritoneal cyst (possible adenomyosis vs endometriosis on frozen), omental adhesions to fibroid, uterine adhesions to pelvic sidewall. adhesions between appendix and uterus 9 cm fundal fibroid, 3cm bladder peritoneal cystic mass (possible endosalpingiosis vs endometriosis on frozen), omental adhesions to fibroid, uterine adhesions to pelvic sidewall. adhesions between appendix and uterus.

## 2018-05-11 NOTE — ASU PREOP CHECKLIST - SELECT TESTS ORDERED
hemoglobin A1C/Type and Screen/HCG/Urinalysis/BMP/UCG/CBC hemoglobin A1C/POCT Blood Glucose/Urinalysis/HCG/UCG/Type and Screen/BMP/CBC

## 2018-05-11 NOTE — BRIEF OPERATIVE NOTE - SPECIMENS
uterus, cerix, bilateral fallopian tubes, bladder peritoneum cyst, appendix fundal myoma, uterus, cervix, bilateral fallopian tubes, bladder peritoneum mass, appendix

## 2018-05-12 LAB
APPEARANCE UR: CLEAR — SIGNIFICANT CHANGE UP
APTT BLD: 27.4 SEC — LOW (ref 27.5–37.4)
APTT BLD: 28.1 SEC — SIGNIFICANT CHANGE UP (ref 27.5–37.4)
BASOPHILS # BLD AUTO: 0.02 K/UL — SIGNIFICANT CHANGE UP (ref 0–0.2)
BASOPHILS NFR BLD AUTO: 0.1 % — SIGNIFICANT CHANGE UP (ref 0–2)
BILIRUB UR-MCNC: NEGATIVE — SIGNIFICANT CHANGE UP
BLOOD UR QL VISUAL: NEGATIVE — SIGNIFICANT CHANGE UP
BUN SERPL-MCNC: 10 MG/DL — SIGNIFICANT CHANGE UP (ref 7–23)
CALCIUM SERPL-MCNC: 8 MG/DL — LOW (ref 8.4–10.5)
CHLORIDE SERPL-SCNC: 101 MMOL/L — SIGNIFICANT CHANGE UP (ref 98–107)
CO2 SERPL-SCNC: 26 MMOL/L — SIGNIFICANT CHANGE UP (ref 22–31)
COLOR SPEC: YELLOW — SIGNIFICANT CHANGE UP
CREAT SERPL-MCNC: 0.81 MG/DL — SIGNIFICANT CHANGE UP (ref 0.5–1.3)
EOSINOPHIL # BLD AUTO: 0 K/UL — SIGNIFICANT CHANGE UP (ref 0–0.5)
EOSINOPHIL NFR BLD AUTO: 0 % — SIGNIFICANT CHANGE UP (ref 0–6)
GLUCOSE SERPL-MCNC: 109 MG/DL — HIGH (ref 70–99)
GLUCOSE UR-MCNC: NEGATIVE — SIGNIFICANT CHANGE UP
HCT VFR BLD CALC: 24 % — LOW (ref 34.5–45)
HCT VFR BLD CALC: 26.8 % — LOW (ref 34.5–45)
HGB BLD-MCNC: 7.2 G/DL — LOW (ref 11.5–15.5)
HGB BLD-MCNC: 8 G/DL — LOW (ref 11.5–15.5)
IMM GRANULOCYTES # BLD AUTO: 0.05 # — SIGNIFICANT CHANGE UP
IMM GRANULOCYTES NFR BLD AUTO: 0.4 % — SIGNIFICANT CHANGE UP (ref 0–1.5)
INR BLD: 1.22 — HIGH (ref 0.88–1.17)
INR BLD: 1.23 — HIGH (ref 0.88–1.17)
KETONES UR-MCNC: NEGATIVE — SIGNIFICANT CHANGE UP
LEUKOCYTE ESTERASE UR-ACNC: NEGATIVE — SIGNIFICANT CHANGE UP
LYMPHOCYTES # BLD AUTO: 1.61 K/UL — SIGNIFICANT CHANGE UP (ref 1–3.3)
LYMPHOCYTES # BLD AUTO: 12 % — LOW (ref 13–44)
MAGNESIUM SERPL-MCNC: 1.5 MG/DL — LOW (ref 1.6–2.6)
MCHC RBC-ENTMCNC: 16.2 PG — LOW (ref 27–34)
MCHC RBC-ENTMCNC: 16.4 PG — LOW (ref 27–34)
MCHC RBC-ENTMCNC: 29.9 % — LOW (ref 32–36)
MCHC RBC-ENTMCNC: 30 % — LOW (ref 32–36)
MCV RBC AUTO: 54.3 FL — LOW (ref 80–100)
MCV RBC AUTO: 54.8 FL — LOW (ref 80–100)
MONOCYTES # BLD AUTO: 0.83 K/UL — SIGNIFICANT CHANGE UP (ref 0–0.9)
MONOCYTES NFR BLD AUTO: 6.2 % — SIGNIFICANT CHANGE UP (ref 2–14)
MUCOUS THREADS # UR AUTO: SIGNIFICANT CHANGE UP
NEUTROPHILS # BLD AUTO: 10.89 K/UL — HIGH (ref 1.8–7.4)
NEUTROPHILS NFR BLD AUTO: 81.3 % — HIGH (ref 43–77)
NITRITE UR-MCNC: NEGATIVE — SIGNIFICANT CHANGE UP
NRBC # FLD: 0 — SIGNIFICANT CHANGE UP
NRBC # FLD: 0 — SIGNIFICANT CHANGE UP
PH UR: 6.5 — SIGNIFICANT CHANGE UP (ref 4.6–8)
PHOSPHATE SERPL-MCNC: 4.1 MG/DL — SIGNIFICANT CHANGE UP (ref 2.5–4.5)
PLATELET # BLD AUTO: 221 K/UL — SIGNIFICANT CHANGE UP (ref 150–400)
PLATELET # BLD AUTO: 225 K/UL — SIGNIFICANT CHANGE UP (ref 150–400)
PMV BLD: SIGNIFICANT CHANGE UP FL (ref 7–13)
PMV BLD: SIGNIFICANT CHANGE UP FL (ref 7–13)
POTASSIUM SERPL-MCNC: 4 MMOL/L — SIGNIFICANT CHANGE UP (ref 3.5–5.3)
POTASSIUM SERPL-SCNC: 4 MMOL/L — SIGNIFICANT CHANGE UP (ref 3.5–5.3)
PROT UR-MCNC: NEGATIVE MG/DL — SIGNIFICANT CHANGE UP
PROTHROM AB SERPL-ACNC: 13.7 SEC — HIGH (ref 9.8–13.1)
PROTHROM AB SERPL-ACNC: 14.1 SEC — HIGH (ref 9.8–13.1)
RBC # BLD: 4.38 M/UL — SIGNIFICANT CHANGE UP (ref 3.8–5.2)
RBC # BLD: 4.94 M/UL — SIGNIFICANT CHANGE UP (ref 3.8–5.2)
RBC # FLD: 23.5 % — HIGH (ref 10.3–14.5)
RBC # FLD: 23.9 % — HIGH (ref 10.3–14.5)
RBC CASTS # UR COMP ASSIST: SIGNIFICANT CHANGE UP (ref 0–?)
SODIUM SERPL-SCNC: 137 MMOL/L — SIGNIFICANT CHANGE UP (ref 135–145)
SP GR SPEC: 1.01 — SIGNIFICANT CHANGE UP (ref 1–1.04)
UROBILINOGEN FLD QL: NORMAL MG/DL — SIGNIFICANT CHANGE UP
WBC # BLD: 13.4 K/UL — HIGH (ref 3.8–10.5)
WBC # BLD: 13.48 K/UL — HIGH (ref 3.8–10.5)
WBC # FLD AUTO: 13.4 K/UL — HIGH (ref 3.8–10.5)
WBC # FLD AUTO: 13.48 K/UL — HIGH (ref 3.8–10.5)
WBC UR QL: SIGNIFICANT CHANGE UP (ref 0–?)

## 2018-05-12 RX ORDER — MAGNESIUM SULFATE 500 MG/ML
2 VIAL (ML) INJECTION ONCE
Qty: 0 | Refills: 0 | Status: COMPLETED | OUTPATIENT
Start: 2018-05-12 | End: 2018-05-12

## 2018-05-12 RX ORDER — ACETAMINOPHEN 500 MG
975 TABLET ORAL ONCE
Qty: 0 | Refills: 0 | Status: COMPLETED | OUTPATIENT
Start: 2018-05-12 | End: 2018-05-12

## 2018-05-12 RX ORDER — PHYTONADIONE (VIT K1) 5 MG
5 TABLET ORAL ONCE
Qty: 0 | Refills: 0 | Status: COMPLETED | OUTPATIENT
Start: 2018-05-12 | End: 2018-05-12

## 2018-05-12 RX ORDER — DOCUSATE SODIUM 100 MG
100 CAPSULE ORAL
Qty: 0 | Refills: 0 | Status: DISCONTINUED | OUTPATIENT
Start: 2018-05-12 | End: 2018-05-14

## 2018-05-12 RX ADMIN — Medication 50 GRAM(S): at 13:48

## 2018-05-12 RX ADMIN — HEPARIN SODIUM 5000 UNIT(S): 5000 INJECTION INTRAVENOUS; SUBCUTANEOUS at 17:18

## 2018-05-12 RX ADMIN — HEPARIN SODIUM 5000 UNIT(S): 5000 INJECTION INTRAVENOUS; SUBCUTANEOUS at 05:26

## 2018-05-12 RX ADMIN — Medication 30 MILLIGRAM(S): at 20:32

## 2018-05-12 RX ADMIN — Medication 100 MILLIGRAM(S): at 21:46

## 2018-05-12 RX ADMIN — Medication 30 MILLIGRAM(S): at 20:50

## 2018-05-12 RX ADMIN — Medication 5 MILLIGRAM(S): at 18:54

## 2018-05-12 RX ADMIN — SODIUM CHLORIDE 3 MILLILITER(S): 9 INJECTION INTRAMUSCULAR; INTRAVENOUS; SUBCUTANEOUS at 05:26

## 2018-05-12 RX ADMIN — SODIUM CHLORIDE 125 MILLILITER(S): 9 INJECTION, SOLUTION INTRAVENOUS at 13:48

## 2018-05-12 RX ADMIN — SODIUM CHLORIDE 3 MILLILITER(S): 9 INJECTION INTRAMUSCULAR; INTRAVENOUS; SUBCUTANEOUS at 13:47

## 2018-05-12 RX ADMIN — SODIUM CHLORIDE 125 MILLILITER(S): 9 INJECTION, SOLUTION INTRAVENOUS at 05:26

## 2018-05-12 RX ADMIN — SODIUM CHLORIDE 3 MILLILITER(S): 9 INJECTION INTRAMUSCULAR; INTRAVENOUS; SUBCUTANEOUS at 21:13

## 2018-05-12 RX ADMIN — Medication 975 MILLIGRAM(S): at 21:12

## 2018-05-12 NOTE — CHART NOTE - NSCHARTNOTEFT_GEN_A_CORE
PA Note    Informed by RN for Tmax 100.3.  Evaluated pt at beside.  Pt denies any CP, SOB, dizziness, h/a, n/v.    Vitals as follows:   Vital Signs Last 24 Hrs  T(C): 37.9 (05-12-18 @ 21:01), Max: 37.9 (05-12-18 @ 21:01)  T(F): 100.3 (05-12-18 @ 21:01), Max: 100.3 (05-12-18 @ 21:01)  HR: 106 (05-12-18 @ 21:01) (75 - 106)  BP: 88/44 (05-12-18 @ 21:01) (88/44 - 103/57)  BP(mean): --  RR: 17 (05-12-18 @ 21:01) (16 - 18)  SpO2: 98% (05-12-18 @ 21:01) (98% - 100%)    PE:  chest: s1s2 +, RRR, CTA b/l no w/r/r  Abd: soft, + appropriate tenderness    Plan:  Tylenol PO x1  advised pt to use Incentive Spirometry more frequently while awake  advised pt the ambulate more frequently with assistance  Re-evaluate pt's vitals in few hours.  If pt continues to spike fever, consider blood culture/urine culture  d/w gyn onc team    STUART Mckeon  #[id number] PA Note    Informed by RN for Tmax 100.3.  Evaluated pt at beside.  Pt denies any CP, SOB, dizziness, h/a, n/v.    Vitals as follows:   Vital Signs Last 24 Hrs  T(C): 37.9 (05-12-18 @ 21:01), Max: 37.9 (05-12-18 @ 21:01)  T(F): 100.3 (05-12-18 @ 21:01), Max: 100.3 (05-12-18 @ 21:01)  HR: 106 (05-12-18 @ 21:01) (75 - 106)  BP: 88/44 (05-12-18 @ 21:01) (88/44 - 103/57)  BP(mean): --  RR: 17 (05-12-18 @ 21:01) (16 - 18)  SpO2: 98% (05-12-18 @ 21:01) (98% - 100%)    PE:  chest: s1s2 +, RRR, CTA b/l no w/r/r  Abd: soft, + appropriate tenderness  Mary in place - adequate output and clear    Plan:  Tylenol PO x1  advised pt to use Incentive Spirometry more frequently while awake  advised pt the ambulate more frequently with assistance  Re-evaluate pt's vitals in few hours.  If pt continues to spike fever, consider blood culture  urinalysis and culture now  d/w gyn onc team  STUART Garrett  #[id number]

## 2018-05-12 NOTE — PROGRESS NOTE ADULT - ATTENDING COMMENTS
Seen on rounds with resident. Labs and flow sheet reviewed. plan repeat CBC and coags mid day. Cont PCEA and murphy for now. Instructions reviewed. Seen on rounds with resident. RN present. Labs and flow sheet reviewed. Feels a bit nauseated intermittently this am. Inc with dry bandage. Binder present. +PCS. plan repeat CBC and coags mid day. Cont PCEA and murphy for now. Instructions reviewed.

## 2018-05-12 NOTE — PROGRESS NOTE ADULT - SUBJECTIVE AND OBJECTIVE BOX
Anesthesia Pain Management Service: Day __ of Epidural    SUBJECTIVE: Patient doing well with PCEA and no problems.  Pain Scale Score:   Refer to charted pain scores    THERAPY:  [x ] Epidural Bupivacaine 0.0625% and Hydromorphone  		[ x] 10 micrograms/mL	[ ] 5 micrograms/mL  [ ] Epidural Bupivacaine 0.0625% and Fentanyl - 2 micrograms/mL  [ ] Epidural Ropivacaine 0.1% plain – 1 mg/mL  [ ] Patient Controlled Regional Anesthesia (PCRA) Ropivacaine  		[ ] 0.2%			[ ] 0.1%    Demand dose __3_ lockout __15_ (minutes) Continuous Rate _6__ Total: _136cc__ Daily      MEDICATIONS  (STANDING):  heparin  Injectable 5000 Unit(s) SubCutaneous every 12 hours  HYDROmorphone (10 MICROgram(s)/mL) + BUpivacaine 0.0625% in 0.9% Sodium Chloride PCEA 250 milliLiter(s) Epidural PCA Continuous  lactated ringers. 1000 milliLiter(s) (125 mL/Hr) IV Continuous <Continuous>  lactated ringers. 1000 milliLiter(s) (30 mL/Hr) IV Continuous <Continuous>  magnesium sulfate  IVPB 2 Gram(s) IV Intermittent once  sodium chloride 0.9% lock flush 3 milliLiter(s) IV Push every 8 hours    MEDICATIONS  (PRN):  docusate sodium 100 milliGRAM(s) Oral three times a day PRN Stool Softening  HYDROmorphone (10 MICROgram(s)/mL) + BUpivacaine 0.0625% in 0.9% Sodium Chloride PCEA Rescue Clinician  Bolus 5 milliLiter(s) Epidural every 15 minutes PRN for Pain Score greater than 6  ketorolac   Injectable 30 milliGRAM(s) IV Push every 6 hours PRN Moderate Pain  naloxone Injectable 0.1 milliGRAM(s) IV Push every 3 minutes PRN For ANY of the following changes in patient status:  A. RR LESS THAN 10 breaths per minute, B. Oxygen saturation LESS THAN 90%, C. Sedation score of 6  ondansetron Injectable 4 milliGRAM(s) IV Push once PRN Postoperative Nausea and/or Vomiting  ondansetron Injectable 4 milliGRAM(s) IV Push every 6 hours PRN Nausea      OBJECTIVE:    Assessment of Catheter Site:	[ ] Left	[ ] Right  [x ] Epidural 	[ ] Femoral	      [ ] Saphenous   [ ] Supraclavicular   [ ] Other:    [x ] Dressing intact	[x ] Site non-tender	[ x] Site without erythema, discharge, edema  [x ] Epidural tubing and connection checked	[x] Gross neurological exam within normal limits  [ ] Catheter removed – tip intact		[x ] Afebrile	[ ] Febrile: ___    PT/INR - ( 12 May 2018 05:20 )   PT: 13.7 SEC;   INR: 1.23          PTT - ( 12 May 2018 05:20 )  PTT:27.4 SEC                      7.2    13.40 )-----------( 225      ( 12 May 2018 05:20 )             24.0     Vital Signs Last 24 Hrs  T(C): 36.9 (05-12-18 @ 05:25), Max: 36.9 (05-11-18 @ 15:01)  T(F): 98.5 (05-12-18 @ 05:25), Max: 98.5 (05-11-18 @ 15:01)  HR: 90 (05-12-18 @ 05:25) (55 - 90)  BP: 98/50 (05-12-18 @ 05:25) (93/49 - 126/63)  BP(mean): 83 (05-11-18 @ 13:45) (80 - 89)  RR: 18 (05-12-18 @ 05:25) (12 - 20)  SpO2: 98% (05-12-18 @ 05:25) (98% - 100%)      Sedation Score:	[x ] Alert	[ ] Drowsy	[ ] Arousable	[ ] Asleep	[ ] Unresponsive    Side Effects:	[x ] None	[ ] Nausea	[ ] Vomiting	[ ] Pruritus  		[ ] Weakness		[ ] Numbness	[ ] Other:    ASSESSMENT/ PLAN:    Therapy to  be:	[x ] Continue   [ ] Discontinued   [ ] Change to prn Analgesics    Documentation and Verification of current medications:  [ X ] Done	[ ] Not done, not eligible, reason:    Comments: Doing OK with epidural and may continue.

## 2018-05-12 NOTE — PROGRESS NOTE ADULT - SUBJECTIVE AND OBJECTIVE BOX
ANESTHESIA POSTOP CHECK    42y Female POSTOP DAY 1 S/P     [ X] NO APPARENT ANESTHESIA COMPLICATIONS      Comments:

## 2018-05-12 NOTE — PROGRESS NOTE ADULT - SUBJECTIVE AND OBJECTIVE BOX
PA GynOn Progress Note POD #    Pt seen, examined at bedside and doing well while meeting all post operative milestones. Pt states mild abdominal pain.  Pt denies fever, chills, chest pain, SOB, nausea, vomiting, lightheadedness, dizziness.  Pt states passing flatus, []BM    T(F): 98.5 (05-12-18 @ 05:25), Max: 98.5 (05-11-18 @ 15:01)  HR: 90 (05-12-18 @ 05:25) (55 - 90)  BP: 98/50 (05-12-18 @ 05:25) (93/49 - 126/63)  RR: 18 (05-12-18 @ 05:25) (12 - 20)  SpO2: 98% (05-12-18 @ 05:25) (98% - 100%)  Wt(kg): --  CAPILLARY BLOOD GLUCOSE    I&O's Detail    11 May 2018 07:01  -  12 May 2018 05:50  --------------------------------------------------------  IN:    IV PiggyBack: 300 mL    lactated ringers.: 1285 mL  Total IN: 1585 mL    OUT:    Indwelling Catheter - Urethral: 1530 mL  Total OUT: 1530 mL    Total NET: 55 mL          MEDICATIONS  (STANDING):  heparin  Injectable 5000 Unit(s) SubCutaneous every 12 hours  HYDROmorphone (10 MICROgram(s)/mL) + BUpivacaine 0.0625% in 0.9% Sodium Chloride PCEA 250 milliLiter(s) Epidural PCA Continuous  lactated ringers. 1000 milliLiter(s) (125 mL/Hr) IV Continuous <Continuous>  lactated ringers. 1000 milliLiter(s) (30 mL/Hr) IV Continuous <Continuous>  sodium chloride 0.9% lock flush 3 milliLiter(s) IV Push every 8 hours    MEDICATIONS  (PRN):  docusate sodium 100 milliGRAM(s) Oral three times a day PRN Stool Softening  HYDROmorphone (10 MICROgram(s)/mL) + BUpivacaine 0.0625% in 0.9% Sodium Chloride PCEA Rescue Clinician  Bolus 5 milliLiter(s) Epidural every 15 minutes PRN for Pain Score greater than 6  ketorolac   Injectable 30 milliGRAM(s) IV Push every 6 hours PRN Moderate Pain  naloxone Injectable 0.1 milliGRAM(s) IV Push every 3 minutes PRN For ANY of the following changes in patient status:  A. RR LESS THAN 10 breaths per minute, B. Oxygen saturation LESS THAN 90%, C. Sedation score of 6  ondansetron Injectable 4 milliGRAM(s) IV Push once PRN Postoperative Nausea and/or Vomiting  ondansetron Injectable 4 milliGRAM(s) IV Push every 6 hours PRN Nausea      Physical Exam:  Constitutional: WDWN female, NAD AxOx3  Skin: no breakdowns noted, warm and dry  Chest: s1s2+, RRR, clear to auscultation bilaterally, no w/r/r    Abdomen: softly distended, no guarding, no rebound, [] bowel sounds, appropriate tenderness noted   Incision site:   vertical incision clean and dry with []intact.    Extremities: no lower extremity edema or calf tenderness bilaterally; intermittent compression stockings in place     LABS:             8.8    14.63 )-----------( 234      ( 05-11 @ 16:47 )             30.2       05-11    138    |  100    |  9      ----------------------------<  155<H>  3.5     |  26     |  0.69     Ca    8.1<L>      11 May 2018 16:50                RADIOLOGY & ADDITIONAL TESTS:    a/p: This 42y female, s/p     CV: hemodynamically stable, H/H []  PUL: adequate on RA  GI:   :  ID: afebrile, WBC stable  DVT prophylaxis:   Pain Management: controlled on []  d/w    -encourage ambulation  -encourage incentive spirometry  -start d/c planning  continue with current care    STUART Mckeon  #[id number] PA GynOnc Progress Note POD #1    Pt seen, examined at bedside and doing well while meeting all post operative milestones. Pt states mild abdominal pain.  Pt using PCEA pump as needed with relief. Pt denies fever, chills, chest pain, SOB, nausea, vomiting, lightheadedness, dizziness.  Pt not passing flatus as yet. Pt has not bee out of bed as yet.    T(F): 98.5 (05-12-18 @ 05:25), Max: 98.5 (05-11-18 @ 15:01)  HR: 90 (05-12-18 @ 05:25) (55 - 90)  BP: 98/50 (05-12-18 @ 05:25) (93/49 - 126/63)  RR: 18 (05-12-18 @ 05:25) (12 - 20)  SpO2: 98% (05-12-18 @ 05:25) (98% - 100%)  Wt(kg): --  CAPILLARY BLOOD GLUCOSE    I&O's Detail    11 May 2018 07:01  -  12 May 2018 05:50  --------------------------------------------------------  IN:    IV PiggyBack: 300 mL    lactated ringers.: 1285 mL  Total IN: 1585 mL    OUT:    Indwelling Catheter - Urethral: 1530 mL  Total OUT: 1530 mL    Total NET: 55 mL      MEDICATIONS  (STANDING):  heparin  Injectable 5000 Unit(s) SubCutaneous every 12 hours  HYDROmorphone (10 MICROgram(s)/mL) + BUpivacaine 0.0625% in 0.9% Sodium Chloride PCEA 250 milliLiter(s) Epidural PCA Continuous  lactated ringers. 1000 milliLiter(s) (125 mL/Hr) IV Continuous <Continuous>  lactated ringers. 1000 milliLiter(s) (30 mL/Hr) IV Continuous <Continuous>  sodium chloride 0.9% lock flush 3 milliLiter(s) IV Push every 8 hours    MEDICATIONS  (PRN):  docusate sodium 100 milliGRAM(s) Oral three times a day PRN Stool Softening  HYDROmorphone (10 MICROgram(s)/mL) + BUpivacaine 0.0625% in 0.9% Sodium Chloride PCEA Rescue Clinician  Bolus 5 milliLiter(s) Epidural every 15 minutes PRN for Pain Score greater than 6  ketorolac   Injectable 30 milliGRAM(s) IV Push every 6 hours PRN Moderate Pain  naloxone Injectable 0.1 milliGRAM(s) IV Push every 3 minutes PRN For ANY of the following changes in patient status:  A. RR LESS THAN 10 breaths per minute, B. Oxygen saturation LESS THAN 90%, C. Sedation score of 6  ondansetron Injectable 4 milliGRAM(s) IV Push once PRN Postoperative Nausea and/or Vomiting  ondansetron Injectable 4 milliGRAM(s) IV Push every 6 hours PRN Nausea      Physical Exam:  Constitutional: WDWN female, NAD AxOx3  Skin: no breakdowns noted, warm and dry  Chest: s1s2+, RRR, clear to auscultation bilaterally, no w/r/r    Abdomen: soft, nondistended, no guarding, no rebound, soft bowel sounds, appropriate tenderness noted   Incision site:   vertical incision clean and dry with dressing intact. Abdominal binder in place  Extremities: no lower extremity edema or calf tenderness bilaterally; intermittent compression stockings in place     LABS:    5/12 labs pending results                8.8    14.63 )-----------( 234      ( 05-11 @ 16:47 )             30.2       05-11    138    |  100    |  9      ----------------------------<  155<H>  3.5     |  26     |  0.69     Ca    8.1<L>      11 May 2018 16:50        a/p: This 42y female, s/p ExLap, JOSÉ MIGUEL, BS, Appendectomy, pt stable at present time    CV: hemodynamically stable, labs pending this am  PUL: adequate on RA  GI: Pt tolerating clear fluids, consider advancing to regular diet as tolerated  : Mary catheter in place, adequate output  ID: afebrile, WBC stable  DVT prophylaxis: Heparin SQ  Pain Management: controlled on PCEA  d/w Dr. Sommer on morning rounds   -encourage ambulation and OOB to chair for day  -encourage incentive spirometry  continue with current post operative care    STUART Mckeon  #[id number]

## 2018-05-13 LAB
APTT BLD: 28.4 SEC — SIGNIFICANT CHANGE UP (ref 27.5–37.4)
BLD GP AB SCN SERPL QL: NEGATIVE — SIGNIFICANT CHANGE UP
BUN SERPL-MCNC: 7 MG/DL — SIGNIFICANT CHANGE UP (ref 7–23)
CALCIUM SERPL-MCNC: 8 MG/DL — LOW (ref 8.4–10.5)
CHLORIDE SERPL-SCNC: 100 MMOL/L — SIGNIFICANT CHANGE UP (ref 98–107)
CO2 SERPL-SCNC: 28 MMOL/L — SIGNIFICANT CHANGE UP (ref 22–31)
CREAT SERPL-MCNC: 0.85 MG/DL — SIGNIFICANT CHANGE UP (ref 0.5–1.3)
GLUCOSE SERPL-MCNC: 111 MG/DL — HIGH (ref 70–99)
HCT VFR BLD CALC: 22.8 % — LOW (ref 34.5–45)
HCT VFR BLD CALC: 22.9 % — LOW (ref 34.5–45)
HCT VFR BLD CALC: 30.4 % — LOW (ref 34.5–45)
HGB BLD-MCNC: 6.7 G/DL — CRITICAL LOW (ref 11.5–15.5)
HGB BLD-MCNC: 6.8 G/DL — CRITICAL LOW (ref 11.5–15.5)
HGB BLD-MCNC: 9.8 G/DL — LOW (ref 11.5–15.5)
INR BLD: 1.15 — SIGNIFICANT CHANGE UP (ref 0.88–1.17)
MAGNESIUM SERPL-MCNC: 2.1 MG/DL — SIGNIFICANT CHANGE UP (ref 1.6–2.6)
MCHC RBC-ENTMCNC: 16.2 PG — LOW (ref 27–34)
MCHC RBC-ENTMCNC: 19 PG — LOW (ref 27–34)
MCHC RBC-ENTMCNC: 29.7 % — LOW (ref 32–36)
MCHC RBC-ENTMCNC: 32.2 % — SIGNIFICANT CHANGE UP (ref 32–36)
MCV RBC AUTO: 54.7 FL — LOW (ref 80–100)
MCV RBC AUTO: 58.8 FL — LOW (ref 80–100)
NRBC # FLD: 0 — SIGNIFICANT CHANGE UP
NRBC # FLD: 0 — SIGNIFICANT CHANGE UP
PHOSPHATE SERPL-MCNC: 2.7 MG/DL — SIGNIFICANT CHANGE UP (ref 2.5–4.5)
PLATELET # BLD AUTO: 177 K/UL — SIGNIFICANT CHANGE UP (ref 150–400)
PLATELET # BLD AUTO: 185 K/UL — SIGNIFICANT CHANGE UP (ref 150–400)
PMV BLD: SIGNIFICANT CHANGE UP FL (ref 7–13)
PMV BLD: SIGNIFICANT CHANGE UP FL (ref 7–13)
POTASSIUM SERPL-MCNC: 3.6 MMOL/L — SIGNIFICANT CHANGE UP (ref 3.5–5.3)
POTASSIUM SERPL-SCNC: 3.6 MMOL/L — SIGNIFICANT CHANGE UP (ref 3.5–5.3)
PROTHROM AB SERPL-ACNC: 13.3 SEC — HIGH (ref 9.8–13.1)
RBC # BLD: 4.19 M/UL — SIGNIFICANT CHANGE UP (ref 3.8–5.2)
RBC # BLD: 5.17 M/UL — SIGNIFICANT CHANGE UP (ref 3.8–5.2)
RBC # FLD: 23.9 % — HIGH (ref 10.3–14.5)
RBC # FLD: 28.8 % — HIGH (ref 10.3–14.5)
RH IG SCN BLD-IMP: POSITIVE — SIGNIFICANT CHANGE UP
SODIUM SERPL-SCNC: 138 MMOL/L — SIGNIFICANT CHANGE UP (ref 135–145)
WBC # BLD: 10.2 K/UL — SIGNIFICANT CHANGE UP (ref 3.8–10.5)
WBC # BLD: 9.78 K/UL — SIGNIFICANT CHANGE UP (ref 3.8–10.5)
WBC # FLD AUTO: 10.2 K/UL — SIGNIFICANT CHANGE UP (ref 3.8–10.5)
WBC # FLD AUTO: 9.78 K/UL — SIGNIFICANT CHANGE UP (ref 3.8–10.5)

## 2018-05-13 RX ORDER — ACETAMINOPHEN 500 MG
975 TABLET ORAL EVERY 6 HOURS
Qty: 0 | Refills: 0 | Status: DISCONTINUED | OUTPATIENT
Start: 2018-05-13 | End: 2018-05-14

## 2018-05-13 RX ORDER — OXYCODONE HYDROCHLORIDE 5 MG/1
5 TABLET ORAL
Qty: 0 | Refills: 0 | Status: DISCONTINUED | OUTPATIENT
Start: 2018-05-13 | End: 2018-05-14

## 2018-05-13 RX ORDER — HEPARIN SODIUM 5000 [USP'U]/ML
5000 INJECTION INTRAVENOUS; SUBCUTANEOUS EVERY 12 HOURS
Qty: 0 | Refills: 0 | Status: DISCONTINUED | OUTPATIENT
Start: 2018-05-13 | End: 2018-05-14

## 2018-05-13 RX ORDER — PHYTONADIONE (VIT K1) 5 MG
5 TABLET ORAL DAILY
Qty: 0 | Refills: 0 | Status: DISCONTINUED | OUTPATIENT
Start: 2018-05-13 | End: 2018-05-14

## 2018-05-13 RX ORDER — OXYCODONE HYDROCHLORIDE 5 MG/1
10 TABLET ORAL EVERY 4 HOURS
Qty: 0 | Refills: 0 | Status: DISCONTINUED | OUTPATIENT
Start: 2018-05-13 | End: 2018-05-14

## 2018-05-13 RX ORDER — DIPHENHYDRAMINE HCL 50 MG
25 CAPSULE ORAL ONCE
Qty: 0 | Refills: 0 | Status: COMPLETED | OUTPATIENT
Start: 2018-05-13 | End: 2018-05-13

## 2018-05-13 RX ADMIN — SODIUM CHLORIDE 3 MILLILITER(S): 9 INJECTION INTRAMUSCULAR; INTRAVENOUS; SUBCUTANEOUS at 13:04

## 2018-05-13 RX ADMIN — Medication 30 MILLIGRAM(S): at 05:56

## 2018-05-13 RX ADMIN — Medication 975 MILLIGRAM(S): at 19:41

## 2018-05-13 RX ADMIN — Medication 975 MILLIGRAM(S): at 20:34

## 2018-05-13 RX ADMIN — Medication 975 MILLIGRAM(S): at 13:20

## 2018-05-13 RX ADMIN — Medication 975 MILLIGRAM(S): at 08:23

## 2018-05-13 RX ADMIN — SODIUM CHLORIDE 3 MILLILITER(S): 9 INJECTION INTRAMUSCULAR; INTRAVENOUS; SUBCUTANEOUS at 05:23

## 2018-05-13 RX ADMIN — Medication 25 MILLIGRAM(S): at 13:20

## 2018-05-13 RX ADMIN — Medication 30 MILLIGRAM(S): at 05:34

## 2018-05-13 RX ADMIN — Medication 5 MILLIGRAM(S): at 12:01

## 2018-05-13 RX ADMIN — SODIUM CHLORIDE 3 MILLILITER(S): 9 INJECTION INTRAMUSCULAR; INTRAVENOUS; SUBCUTANEOUS at 22:00

## 2018-05-13 RX ADMIN — HEPARIN SODIUM 5000 UNIT(S): 5000 INJECTION INTRAVENOUS; SUBCUTANEOUS at 22:02

## 2018-05-13 RX ADMIN — Medication 100 MILLIGRAM(S): at 11:30

## 2018-05-13 RX ADMIN — HEPARIN SODIUM 5000 UNIT(S): 5000 INJECTION INTRAVENOUS; SUBCUTANEOUS at 05:23

## 2018-05-13 RX ADMIN — Medication 100 MILLIGRAM(S): at 21:59

## 2018-05-13 NOTE — PROGRESS NOTE ADULT - ATTENDING COMMENTS
Seen on rounds with PA. Labs noted. Flow reviewed. Plan transfusion. Epidural out. [employer]. OOB as tolerated. Instructions reviewed. Seen on rounds with PA. Labs noted. Flow reviewed.   She was nauseated and vomited a small amount when seen.   NAD, Abd soft appropriately tender, inc CDI, min distended.   She felt better afterword and remains hungry. Plan IVF, sips as tolerated, and reassess diet later.   Plan transfusion; Vit K dose; d/c heparin.   Epidural out.   OOB as tolerated. Instructions reviewed.

## 2018-05-13 NOTE — PROGRESS NOTE ADULT - SUBJECTIVE AND OBJECTIVE BOX
Anesthesia Pain Management Service: Day __ of Epidural    SUBJECTIVE: Patient doing well with PCEA and no problems.  Pain Scale Score:   Refer to charted pain scores    THERAPY:  [x ] Epidural Bupivacaine 0.0625% and Hydromorphone  		[x ] 10 micrograms/mL	[ ] 5 micrograms/mL  [ ] Epidural Bupivacaine 0.0625% and Fentanyl - 2 micrograms/mL  [ ] Epidural Ropivacaine 0.1% plain – 1 mg/mL  [ ] Patient Controlled Regional Anesthesia (PCRA) Ropivacaine  		[ ] 0.2%			[ ] 0.1%    Demand dose __3_ lockout __15_ (minutes) Continuous Rate 6___ Total: 164cc___ Daily      MEDICATIONS  (STANDING):  acetaminophen   Tablet. 975 milliGRAM(s) Oral every 6 hours  docusate sodium 100 milliGRAM(s) Oral two times a day  heparin  Injectable 5000 Unit(s) SubCutaneous every 12 hours  HYDROmorphone (10 MICROgram(s)/mL) + BUpivacaine 0.0625% in 0.9% Sodium Chloride PCEA 250 milliLiter(s) Epidural PCA Continuous  lactated ringers. 1000 milliLiter(s) (75 mL/Hr) IV Continuous <Continuous>  sodium chloride 0.9% lock flush 3 milliLiter(s) IV Push every 8 hours    MEDICATIONS  (PRN):  HYDROmorphone (10 MICROgram(s)/mL) + BUpivacaine 0.0625% in 0.9% Sodium Chloride PCEA Rescue Clinician  Bolus 5 milliLiter(s) Epidural every 15 minutes PRN for Pain Score greater than 6  ketorolac   Injectable 30 milliGRAM(s) IV Push every 6 hours PRN Moderate Pain  naloxone Injectable 0.1 milliGRAM(s) IV Push every 3 minutes PRN For ANY of the following changes in patient status:  A. RR LESS THAN 10 breaths per minute, B. Oxygen saturation LESS THAN 90%, C. Sedation score of 6  ondansetron Injectable 4 milliGRAM(s) IV Push every 6 hours PRN Nausea  oxyCODONE    IR 5 milliGRAM(s) Oral every 3 hours PRN Mild Pain (1 - 3)  oxyCODONE    IR 10 milliGRAM(s) Oral every 4 hours PRN Severe Pain (7 - 10)      OBJECTIVE:    Assessment of Catheter Site:	[ ] Left	[ ] Right  [x ] Epidural 	[ ] Femoral	      [ ] Saphenous   [ ] Supraclavicular   [ ] Other:    [x ] Dressing intact	[x ] Site non-tender	[ x] Site without erythema, discharge, edema  [x ] Epidural tubing and connection checked	[x] Gross neurological exam within normal limits  [x ] Catheter removed – tip intact		[x ] Afebrile	[ ] Febrile: ___    PT/INR - ( 13 May 2018 04:06 )   PT: 13.3 SEC;   INR: 1.15          PTT - ( 13 May 2018 04:06 )  PTT:28.4 SEC                      6.7    x     )-----------( x        ( 13 May 2018 06:20 )             22.8     Vital Signs Last 24 Hrs  T(C): 36.8 (05-13-18 @ 05:32), Max: 37.9 (05-12-18 @ 21:01)  T(F): 98.3 (05-13-18 @ 05:32), Max: 100.3 (05-12-18 @ 21:01)  HR: 76 (05-13-18 @ 05:32) (76 - 106)  BP: 100/64 (05-13-18 @ 05:32) (85/45 - 100/64)  BP(mean): --  RR: 17 (05-13-18 @ 05:32) (16 - 18)  SpO2: 100% (05-13-18 @ 05:32) (98% - 100%)      Sedation Score:	[x ] Alert	[ ] Drowsy	[ ] Arousable	[ ] Asleep	[ ] Unresponsive    Side Effects:	[x ] None	[ ] Nausea	[ ] Vomiting	[ ] Pruritus  		[ ] Weakness		[ ] Numbness	[ ] Other:    ASSESSMENT/ PLAN:    Therapy to  be:	[] Continue   [x ] Discontinued   [x ] Change to prn Analgesics    Documentation and Verification of current medications:  [ X ] Done	[ ] Not done, not eligible, reason:    Comments:

## 2018-05-13 NOTE — PROGRESS NOTE ADULT - SUBJECTIVE AND OBJECTIVE BOX
PA GynOnc Progress Note POD #    Pt seen, examined at bedside and doing well while meeting all post operative milestones. Pt states mild abdominal pain.  Pt denies fever, chills, chest pain, SOB, nausea, vomiting, lightheadedness, dizziness.  Pt states passing flatus, []BM    T(F): 98 (18 @ 01:35), Max: 100.3 (18 @ 21:01)  HR: 80 (18 @ 01:35) (80 - 106)  BP: 85/45 (18 @ 01:35) (85/45 - 100/55)  RR: 16 (18 @ 01:35) (16 - 18)  SpO2: 100% (18 @ 01:35) (98% - 100%)  Wt(kg): --  CAPILLARY BLOOD GLUCOSE    I&O's Detail    11 May 2018 07:01  -  12 May 2018 07:00  --------------------------------------------------------  IN:    IV PiggyBack: 300 mL    lactated ringers.: 1660 mL  Total IN: 1960 mL    OUT:    Indwelling Catheter - Urethral: 1830 mL  Total OUT: 1830 mL    Total NET: 130 mL      12 May 2018 07:01  -  13 May 2018 05:32  --------------------------------------------------------  IN:    lactated ringers.: 300 mL  Total IN: 300 mL    OUT:    Indwelling Catheter - Urethral: 1300 mL  Total OUT: 1300 mL    Total NET: -1000 mL          MEDICATIONS  (STANDING):  docusate sodium 100 milliGRAM(s) Oral two times a day  heparin  Injectable 5000 Unit(s) SubCutaneous every 12 hours  HYDROmorphone (10 MICROgram(s)/mL) + BUpivacaine 0.0625% in 0.9% Sodium Chloride PCEA 250 milliLiter(s) Epidural PCA Continuous  lactated ringers. 1000 milliLiter(s) (75 mL/Hr) IV Continuous <Continuous>  sodium chloride 0.9% lock flush 3 milliLiter(s) IV Push every 8 hours    MEDICATIONS  (PRN):  HYDROmorphone (10 MICROgram(s)/mL) + BUpivacaine 0.0625% in 0.9% Sodium Chloride PCEA Rescue Clinician  Bolus 5 milliLiter(s) Epidural every 15 minutes PRN for Pain Score greater than 6  ketorolac   Injectable 30 milliGRAM(s) IV Push every 6 hours PRN Moderate Pain  naloxone Injectable 0.1 milliGRAM(s) IV Push every 3 minutes PRN For ANY of the following changes in patient status:  A. RR LESS THAN 10 breaths per minute, B. Oxygen saturation LESS THAN 90%, C. Sedation score of 6  ondansetron Injectable 4 milliGRAM(s) IV Push every 6 hours PRN Nausea      Physical Exam:  Constitutional: WDWN female, NAD AxOx3  Skin: no breakdowns noted, warm and dry  Chest: s1s2+, RRR, clear to auscultation bilaterally, no w/r/r    Abdomen: softly distended, no guarding, no rebound, [] bowel sounds, appropriate tenderness noted   Incision site:   vertical incision clean and dry with []intact.    Extremities: no lower extremity edema or calf tenderness bilaterally; intermittent compression stockings in place     LABS:             6.8    10.20 )-----------( 185      (  @ 04:06 )             22.9                8.0    13.48 )-----------( 221      (  @ 12:17 )             26.8                7.2    13.40 )-----------( 225      (  @ 05:20 )             24.0                8.8    14.63 )-----------( 234      ( 11 @ 16:47 )             30.2           138    |  100    |  7      ----------------------------<  111<H>  3.6     |  28     |  0.85       137    |  101    |  10     ----------------------------<  109<H>  4.0     |  26     |  0.81     Ca    8.0<L>      13 May 2018 04:06  Ca    8.0<L>      12 May 2018 05:20  Phos  2.7       05-13  Phos  4.1       05-12  Mg     2.1       05-13  Mg     1.5       05-12          PT/INR - ( 13 May 2018 04:06 )   PT: 13.3 SEC;   INR: 1.15          PTT - ( 13 May 2018 04:06 )  PTT:28.4 SEC  Urinalysis Basic - ( 12 May 2018 21:50 )    Color: YELLOW / Appearance: CLEAR / S.010 / pH: 6.5  Gluc: NEGATIVE / Ketone: NEGATIVE  / Bili: NEGATIVE / Urobili: NORMAL mg/dL   Blood: NEGATIVE / Protein: NEGATIVE mg/dL / Nitrite: NEGATIVE   Leuk Esterase: NEGATIVE / RBC: 0-2 / WBC 2-5   Sq Epi: x / Non Sq Epi: x / Bacteria: x        RADIOLOGY & ADDITIONAL TESTS:    a/p: This 42y female, s/p     CV: hemodynamically stable, H/H []  PUL: adequate on RA  GI:   :  ID: afebrile, WBC stable  DVT prophylaxis:   Pain Management: controlled on []  d/w    -encourage ambulation  -encourage incentive spirometry  -start d/c planning  continue with current care    STUART Mckeon  #[id number] [facility] Progress Note POD #2    Pt seen, examined at bedside and doing well while meeting all post operative milestones. Pt states mild abdominal pain.  Pt denies fever, chills, chest pain, SOB, nausea, vomiting, lightheadedness, dizziness.  Pt had a temp of 100.3 last night at 1030pm, urinalysis sent.  Pt denied any associated symptoms at that time.  Tylenol was given and incentive spirometry was encouraged.    T(F): 98 (18 @ 01:35), Max: 100.3 (18 @ 21:01)  HR: 80 (18 @ 01:35) (80 - 106)  BP: 85/45 (18 @ 01:35) (85/45 - 100/55)  RR: 16 (18 @ 01:35) (16 - 18)  SpO2: 100% (18 @ 01:35) (98% - 100%)  Wt(kg): --  CAPILLARY BLOOD GLUCOSE    I&O's Detail    11 May 2018 07:01  -  12 May 2018 07:00  --------------------------------------------------------  IN:    IV PiggyBack: 300 mL    lactated ringers.: 1660 mL  Total IN: 1960 mL    OUT:    Indwelling Catheter - Urethral: 1830 mL  Total OUT: 1830 mL    Total NET: 130 mL      12 May 2018 07:  -  13 May 2018 05:32  --------------------------------------------------------  IN:    lactated ringers.: 300 mL  Total IN: 300 mL    OUT:    Indwelling Catheter - Urethral: 1300 mL  Total OUT: 1300 mL    Total NET: -1000 mL    MEDICATIONS  (STANDING):  docusate sodium 100 milliGRAM(s) Oral two times a day  heparin  Injectable 5000 Unit(s) SubCutaneous every 12 hours  HYDROmorphone (10 MICROgram(s)/mL) + BUpivacaine 0.0625% in 0.9% Sodium Chloride PCEA 250 milliLiter(s) Epidural PCA Continuous  lactated ringers. 1000 milliLiter(s) (75 mL/Hr) IV Continuous <Continuous>  sodium chloride 0.9% lock flush 3 milliLiter(s) IV Push every 8 hours    MEDICATIONS  (PRN):  HYDROmorphone (10 MICROgram(s)/mL) + BUpivacaine 0.0625% in 0.9% Sodium Chloride PCEA Rescue Clinician  Bolus 5 milliLiter(s) Epidural every 15 minutes PRN for Pain Score greater than 6  ketorolac   Injectable 30 milliGRAM(s) IV Push every 6 hours PRN Moderate Pain  naloxone Injectable 0.1 milliGRAM(s) IV Push every 3 minutes PRN For ANY of the following changes in patient status:  A. RR LESS THAN 10 breaths per minute, B. Oxygen saturation LESS THAN 90%, C. Sedation score of 6  ondansetron Injectable 4 milliGRAM(s) IV Push every 6 hours PRN Nausea      Physical Exam:  Constitutional: WDWN female, NAD AxOx3  Skin: no breakdowns noted, warm and dry  Chest: s1s2+, RRR, clear to auscultation bilaterally, no w/r/r    Abdomen: softly distended, no guarding, no rebound, + bowel sounds, appropriate tenderness noted   Incision site:   vertical incision clean and dry with steri strips intact.    Extremities: no lower extremity edema or calf tenderness bilaterally; intermittent compression stockings in place     LABS:             6.8    10.20 )-----------( 185      (  @ 04:06 )             22.9                8.0    13.48 )-----------( 221      (  @ 12:17 )             26.8                7.2    13.40 )-----------( 225      (  @ 05:20 )             24.0                8.8    14.63 )-----------( 234      ( 11 @ 16:47 )             30.2           138    |  100    |  7      ----------------------------<  111<H>  3.6     |  28     |  0.85       137    |  101    |  10     ----------------------------<  109<H>  4.0     |  26     |  0.81     Ca    8.0<L>      13 May 2018 04:06  Ca    8.0<L>      12 May 2018 05:20  Phos  2.7       05-13  Phos  4.1       05-12  Mg     2.1       05-13  Mg     1.5       05-12          PT/INR - ( 13 May 2018 04:06 )   PT: 13.3 SEC;   INR: 1.15          PTT - ( 13 May 2018 04:06 )  PTT:28.4 SEC  Urinalysis Basic - ( 12 May 2018 21:50 )    Color: YELLOW / Appearance: CLEAR / S.010 / pH: 6.5  Gluc: NEGATIVE / Ketone: NEGATIVE  / Bili: NEGATIVE / Urobili: NORMAL mg/dL   Blood: NEGATIVE / Protein: NEGATIVE mg/dL / Nitrite: NEGATIVE   Leuk Esterase: NEGATIVE / RBC: 0-2 / WBC 2-5   Sq Epi: x / Non Sq Epi: x / Bacteria: x        a/p: This 42y female, s/p ExLap, JOSÉ MIGUEL, BS, Appendectomy, s/p fever,  pt stable at present time    CV: hemodynamically stable, H/H returned critical value this am, repeated for accuracy.  If still low, 2uPRBC to be infused.  PUL: adequate on RA  GI: tolerating regular diet, not passing flatus  : murphy catheter in place, d/c 4 hours after removal of PCEA  ID: afebrile, WBC stable  DVT prophylaxis: Heparin  Pain Management: controlled on PCEA, will transition to PO medications after removal of epidural catheter  d/w Dr. Sommer on morning rounds  -repeat H/H, if still low, 2uPRBCs  -encourage ambulation  -encourage incentive spirometry  -start d/c planning for   -gyn onc team aware of 1st CBC values  continue with current care    STUART Mckeon  #[id number]

## 2018-05-14 ENCOUNTER — TRANSCRIPTION ENCOUNTER (OUTPATIENT)
Age: 43
End: 2018-05-14

## 2018-05-14 VITALS
HEART RATE: 84 BPM | TEMPERATURE: 98 F | SYSTOLIC BLOOD PRESSURE: 126 MMHG | DIASTOLIC BLOOD PRESSURE: 81 MMHG | OXYGEN SATURATION: 100 % | RESPIRATION RATE: 16 BRPM

## 2018-05-14 LAB
BUN SERPL-MCNC: 6 MG/DL — LOW (ref 7–23)
CALCIUM SERPL-MCNC: 8.1 MG/DL — LOW (ref 8.4–10.5)
CHLORIDE SERPL-SCNC: 100 MMOL/L — SIGNIFICANT CHANGE UP (ref 98–107)
CO2 SERPL-SCNC: 25 MMOL/L — SIGNIFICANT CHANGE UP (ref 22–31)
CREAT SERPL-MCNC: 0.7 MG/DL — SIGNIFICANT CHANGE UP (ref 0.5–1.3)
GLUCOSE SERPL-MCNC: 100 MG/DL — HIGH (ref 70–99)
HCT VFR BLD CALC: 29.4 % — LOW (ref 34.5–45)
HGB BLD-MCNC: 9.4 G/DL — LOW (ref 11.5–15.5)
MAGNESIUM SERPL-MCNC: 1.9 MG/DL — SIGNIFICANT CHANGE UP (ref 1.6–2.6)
MCHC RBC-ENTMCNC: 19.1 PG — LOW (ref 27–34)
MCHC RBC-ENTMCNC: 32 % — SIGNIFICANT CHANGE UP (ref 32–36)
MCV RBC AUTO: 59.6 FL — LOW (ref 80–100)
NRBC # FLD: 0 — SIGNIFICANT CHANGE UP
PHOSPHATE SERPL-MCNC: 3 MG/DL — SIGNIFICANT CHANGE UP (ref 2.5–4.5)
PLATELET # BLD AUTO: 198 K/UL — SIGNIFICANT CHANGE UP (ref 150–400)
PMV BLD: SIGNIFICANT CHANGE UP FL (ref 7–13)
POTASSIUM SERPL-MCNC: 3.7 MMOL/L — SIGNIFICANT CHANGE UP (ref 3.5–5.3)
POTASSIUM SERPL-SCNC: 3.7 MMOL/L — SIGNIFICANT CHANGE UP (ref 3.5–5.3)
RBC # BLD: 4.93 M/UL — SIGNIFICANT CHANGE UP (ref 3.8–5.2)
RBC # FLD: 28.5 % — HIGH (ref 10.3–14.5)
SODIUM SERPL-SCNC: 138 MMOL/L — SIGNIFICANT CHANGE UP (ref 135–145)
WBC # BLD: 8.8 K/UL — SIGNIFICANT CHANGE UP (ref 3.8–10.5)
WBC # FLD AUTO: 8.8 K/UL — SIGNIFICANT CHANGE UP (ref 3.8–10.5)

## 2018-05-14 RX ORDER — ACETAMINOPHEN 500 MG
3 TABLET ORAL
Qty: 0 | Refills: 0 | COMMUNITY
Start: 2018-05-14

## 2018-05-14 RX ORDER — CIPROFLOXACIN LACTATE 400MG/40ML
0 VIAL (ML) INTRAVENOUS
Qty: 0 | Refills: 0 | COMMUNITY

## 2018-05-14 RX ORDER — OXYCODONE HYDROCHLORIDE 5 MG/1
1 TABLET ORAL
Qty: 30 | Refills: 0 | OUTPATIENT
Start: 2018-05-14 | End: 2018-05-18

## 2018-05-14 RX ADMIN — Medication 975 MILLIGRAM(S): at 06:00

## 2018-05-14 RX ADMIN — SODIUM CHLORIDE 3 MILLILITER(S): 9 INJECTION INTRAMUSCULAR; INTRAVENOUS; SUBCUTANEOUS at 06:38

## 2018-05-14 RX ADMIN — Medication 975 MILLIGRAM(S): at 05:08

## 2018-05-14 RX ADMIN — ONDANSETRON 4 MILLIGRAM(S): 8 TABLET, FILM COATED ORAL at 01:09

## 2018-05-14 NOTE — DISCHARGE NOTE ADULT - CARE PLAN
Principal Discharge DX:	Postoperative state  Goal:	Recovery  Assessment and plan of treatment:	Regular diet.  Resume normal activity as tolerated.  No heavy lifting, driving, or strenuous activity for 6 weeks.  Call your doctor with any signs and symptoms of infection such as fever, chills, nausea or vomiting.  Call your doctor with redness or swelling at the incision site, fluid leakage or wound separation.  Call your doctor if you're unable to tolerate food or have difficulty urinating.  Call your doctor if you have pain that is not relieved by your prescribed medications.  Notify your doctor with any other concerns.  Follow up with Dr. Machado  in 2 weeks.

## 2018-05-14 NOTE — DISCHARGE NOTE ADULT - CARE PROVIDER_API CALL
Lisa Lewis), Gynecologic Oncology; Obstetrics and Gynecology  [address] [place] [address]  Phone: [phone]  Fax: [phone]

## 2018-05-14 NOTE — DISCHARGE NOTE ADULT - MEDICATION SUMMARY - MEDICATIONS TO TAKE
I will START or STAY ON the medications listed below when I get home from the hospital:    acetaminophen 325 mg oral tablet  -- 3 tab(s) by mouth every 6 hours  -- Indication: For Pain    oxyCODONE 5 mg oral tablet  -- 1 tab(s) by mouth every 4 hours MDD:4  -- Caution federal law prohibits the transfer of this drug to any person other  than the person for whom it was prescribed.  It is very important that you take or use this exactly as directed.  Do not skip doses or discontinue unless directed by your doctor.  May cause drowsiness.  Alcohol may intensify this effect.  Use care when operating dangerous machinery.  This prescription cannot be refilled.  Using more of this medication than prescribed may cause serious breathing problems.    -- Indication: For Pain    ibuprofen 200 mg oral tablet  -- 1 tab(s) by mouth every 6 hours, As Needed/last dose 5/6/18  -- Indication: For Pain    Ceftin 250 mg oral tablet  -- 1 tab(s) by mouth every 12 hours /last dose  4/21/18  -- Indication: For Home med

## 2018-05-14 NOTE — DISCHARGE NOTE ADULT - PLAN OF CARE
Recovery Regular diet.  Resume normal activity as tolerated.  No heavy lifting, driving, or strenuous activity for 6 weeks.  Call your doctor with any signs and symptoms of infection such as fever, chills, nausea or vomiting.  Call your doctor with redness or swelling at the incision site, fluid leakage or wound separation.  Call your doctor if you're unable to tolerate food or have difficulty urinating.  Call your doctor if you have pain that is not relieved by your prescribed medications.  Notify your doctor with any other concerns.  Follow up with Dr. Machado  in 2 weeks.

## 2018-05-14 NOTE — DISCHARGE NOTE ADULT - HOSPITAL COURSE
43 y/o s/p Ex-lap, JOSÉ MIGUEL, BS, Appendectomy.  See operative note for details of procedure.  POD#1, patient was advanced to a regular diet.  POD#1, murphy was discontinued and patient voided spontaneously.  Patient was discharged on POD#3 in stable condition with adequate pain control, ambulating, tolerating PO and voiding spontaneously.  Patient to follow up with Dr. Lewis  in 2 weeks. 41 y/o s/p Ex-lap, JOSÉ MIGUEL, BS, Appendectomy.  See operative note for details of procedure.  POD#1, patient was advanced to a regular diet.  POD#1, murphy was discontinued and patient voided spontaneously.  POD#2 patient noted to have low H/H likely due to post-operative acute blood loss anemia.  Transfused 2u PRBC with adequate H/H rise.  Patient was discharged on POD#3 in stable condition with adequate pain control, ambulating, tolerating PO and voiding spontaneously.  Patient to follow up with Dr. Lewis  in 2 weeks.

## 2018-05-14 NOTE — PROGRESS NOTE ADULT - SUBJECTIVE AND OBJECTIVE BOX
Gyn ONC Progress Note POD #    Subjective:   Pt seen and examined at bedside. Transfused 2upRBC yesterday, tolerated well. Pain well controlled. Patient ambulating. Passing flatus. Tolerating regular diet. Pt endorses spitting up, no emesis. Pt denies fever, chills, chest pain, SOB, nausea, vomiting, lightheadedness, dizziness.      Objective:  T(F): 98.4 (18 @ 06:12), Max: 98.7 (18 @ 20:47)  HR: 79 (18 @ 06:12) (79 - 93)  BP: 136/82 (18 @ 06:12) (116/70 - 137/85)  RR: 16 (18 @ 06:12) (16 - 18)  SpO2: 97% (18 @ 06:12) (96% - 100%)  Wt(kg): --  I&O's Summary    12 May 2018 07:01  -  13 May 2018 07:00  --------------------------------------------------------  IN: 600 mL / OUT: 1900 mL / NET: -1300 mL    13 May 2018 07:01  -  14 May 2018 06:18  --------------------------------------------------------  IN: 750 mL / OUT: 1550 mL / NET: -800 mL      CAPILLARY BLOOD GLUCOSE          MEDICATIONS  (STANDING):  acetaminophen   Tablet. 975 milliGRAM(s) Oral every 6 hours  docusate sodium 100 milliGRAM(s) Oral two times a day  heparin  Injectable 5000 Unit(s) SubCutaneous every 12 hours  phytonadione   Solution 5 milliGRAM(s) Oral daily  sodium chloride 0.9% lock flush 3 milliLiter(s) IV Push every 8 hours    MEDICATIONS  (PRN):  ondansetron Injectable 4 milliGRAM(s) IV Push every 6 hours PRN Nausea  oxyCODONE    IR 5 milliGRAM(s) Oral every 3 hours PRN Mild Pain (1 - 3)  oxyCODONE    IR 10 milliGRAM(s) Oral every 4 hours PRN Severe Pain (7 - 10)      Physical Exam:  Constitutional: NAD, A+O x3  CV: RRR  Lungs: clear to auscultation bilaterally  Abdomen: soft, nondistended, no guarding, no rebound, normal bowel sounds  Incision: clean, dry, intact  Extremities: no lower extremity edema or calf tenderness bilaterally; venodynes in place    LABS:        138    |  100    |  7      ----------------------------<  111<H>  3.6     |  28     |  0.85     Ca    8.0<L>      13 May 2018 04:06  Phos  2.7         Mg     2.1                 PT/INR - ( 13 May 2018 04:06 )   PT: 13.3 SEC;   INR: 1.15          PTT - ( 13 May 2018 04:06 )  PTT:28.4 SEC  Urinalysis Basic - ( 12 May 2018 21:50 )    Color: YELLOW / Appearance: CLEAR / S.010 / pH: 6.5  Gluc: NEGATIVE / Ketone: NEGATIVE  / Bili: NEGATIVE / Urobili: NORMAL mg/dL   Blood: NEGATIVE / Protein: NEGATIVE mg/dL / Nitrite: NEGATIVE   Leuk Esterase: NEGATIVE / RBC: 0-2 / WBC 2-5   Sq Epi: x / Non Sq Epi: x / Bacteria: x        CV: Hemodynamically stable  Pulm: Saturating well on RA. Increase incentive spirometry.  GI: Advance diet as tolerated  : Mary in place. Adequate UOP  Heme: Continue HSQ/Lovenox/Venodynes for DVT ppx. Increase OOB.    Neuro: PCA for pain control. // Continue oral meds for pain control. Gyn ONC Progress Note POD #3    Subjective:   Pt seen and examined at bedside. Transfused 2upRBC yesterday, tolerated well. Pain is well controlled. Patient ambulating. Passing flatus. Tolerating regular diet. Pt endorses spitting up, no emesis. Pt denies fever, chills, chest pain, SOB, nausea, vomiting, lightheadedness, dizziness. Pt would like to go home today    Objective:  T(F): 98.4 (18 @ 06:12), Max: 98.7 (18 @ 20:47)  HR: 79 (18 @ 06:12) (79 - 93)  BP: 136/82 (18 @ 06:12) (116/70 - 137/85)  RR: 16 (18 @ 06:12) (16 - 18)  SpO2: 97% (18 @ 06:12) (96% - 100%)  I&O's Summary    12 May 2018 07:01  -  13 May 2018 07:00  --------------------------------------------------------  IN: 600 mL / OUT: 1900 mL / NET: -1300 mL    13 May 2018 07:01  -  14 May 2018 06:18  --------------------------------------------------------  IN: 750 mL / OUT: 1550 mL / NET: -800 mL      CAPILLARY BLOOD GLUCOSE          MEDICATIONS  (STANDING):  acetaminophen   Tablet. 975 milliGRAM(s) Oral every 6 hours  docusate sodium 100 milliGRAM(s) Oral two times a day  heparin  Injectable 5000 Unit(s) SubCutaneous every 12 hours  phytonadione   Solution 5 milliGRAM(s) Oral daily  sodium chloride 0.9% lock flush 3 milliLiter(s) IV Push every 8 hours    MEDICATIONS  (PRN):  ondansetron Injectable 4 milliGRAM(s) IV Push every 6 hours PRN Nausea  oxyCODONE    IR 5 milliGRAM(s) Oral every 3 hours PRN Mild Pain (1 - 3)  oxyCODONE    IR 10 milliGRAM(s) Oral every 4 hours PRN Severe Pain (7 - 10)      Physical Exam:  Constitutional: NAD, A+O x3  CV: RRR  Lungs: clear to auscultation bilaterally  Abdomen: soft, nondistended, no guarding, no rebound, normal bowel sounds  Incision: midline vertical incision clean, dry, intact  Extremities: no lower extremity edema or calf tenderness bilaterally; venodynes in place    LABS:        138    |  100    |  7      ----------------------------<  111<H>  3.6     |  28     |  0.85     Ca    8.0<L>      13 May 2018 04:06  Phos  2.7         Mg     2.1                 PT/INR - ( 13 May 2018 04:06 )   PT: 13.3 SEC;   INR: 1.15          PTT - ( 13 May 2018 04:06 )  PTT:28.4 SEC  Urinalysis Basic - ( 12 May 2018 21:50 )    Color: YELLOW / Appearance: CLEAR / S.010 / pH: 6.5  Gluc: NEGATIVE / Ketone: NEGATIVE  / Bili: NEGATIVE / Urobili: NORMAL mg/dL   Blood: NEGATIVE / Protein: NEGATIVE mg/dL / Nitrite: NEGATIVE   Leuk Esterase: NEGATIVE / RBC: 0-2 / WBC 2-5   Sq Epi: x / Non Sq Epi: x / Bacteria: x

## 2018-05-14 NOTE — DISCHARGE NOTE ADULT - INSTRUCTIONS
call md office if having temperature above 101,if not tolerating diet ,nauseous ,vomiting,if incison site looks red ,swollen or discharge noted ,if having excruciating abdominal pain  not relived by medication

## 2018-05-14 NOTE — PROGRESS NOTE ADULT - ASSESSMENT
43yo with AUB, fibroid uterus now POD#3 s/p ex-lap, JOSÉ MIGUEL, BS, appendectomy.  Frozen = benign  Postop course c/b acute postop anemia, pt received 2upRBC with appropriate H/h improvement      CV: Hemodynamically stable, no sx of anemia  Pulm: Saturating well on RA. Increase incentive spirometry.  GI: tolerating regular diet  : voiding freely, no issues  Heme: Continue SQH/Venodynes for DVT ppx  Neuro: Continue oral meds for pain control.

## 2018-05-14 NOTE — DISCHARGE NOTE ADULT - PATIENT PORTAL LINK FT
You can access the [facility] Portal, offered by [facility], by registering with the following website: [email or web address]

## 2018-05-21 LAB — SURGICAL PATHOLOGY STUDY: SIGNIFICANT CHANGE UP

## 2018-05-29 ENCOUNTER — APPOINTMENT (OUTPATIENT)
Dept: GYNECOLOGIC ONCOLOGY | Facility: CLINIC | Age: 43
End: 2018-05-29
Payer: COMMERCIAL

## 2018-05-29 VITALS
HEIGHT: 63 IN | SYSTOLIC BLOOD PRESSURE: 139 MMHG | HEART RATE: 83 BPM | BODY MASS INDEX: 23.92 KG/M2 | DIASTOLIC BLOOD PRESSURE: 86 MMHG | WEIGHT: 135 LBS

## 2018-05-29 DIAGNOSIS — Z87.42 PERSONAL HISTORY OF OTHER DISEASES OF THE FEMALE GENITAL TRACT: ICD-10-CM

## 2018-05-29 DIAGNOSIS — D25.9 LEIOMYOMA OF UTERUS, UNSPECIFIED: ICD-10-CM

## 2018-05-29 DIAGNOSIS — N80.3 ENDOMETRIOSIS OF PELVIC PERITONEUM: ICD-10-CM

## 2018-05-29 PROCEDURE — 99024 POSTOP FOLLOW-UP VISIT: CPT

## 2018-05-29 RX ORDER — OXYCODONE 5 MG/1
5 TABLET ORAL
Qty: 30 | Refills: 0 | Status: COMPLETED | COMMUNITY
Start: 2018-05-14

## 2018-05-29 RX ORDER — CIPROFLOXACIN HYDROCHLORIDE 500 MG/1
500 TABLET, FILM COATED ORAL
Qty: 14 | Refills: 0 | Status: COMPLETED | COMMUNITY
Start: 2018-04-25

## 2018-05-29 RX ORDER — OSELTAMIVIR PHOSPHATE 75 MG/1
75 CAPSULE ORAL
Qty: 10 | Refills: 0 | Status: COMPLETED | COMMUNITY
Start: 2018-04-19

## 2018-05-29 RX ORDER — CEFUROXIME AXETIL 250 MG/1
250 TABLET ORAL
Qty: 20 | Refills: 0 | Status: COMPLETED | COMMUNITY
Start: 2018-04-21

## 2018-05-29 RX ORDER — TRANEXAMIC ACID 650 MG/1
650 TABLET ORAL
Qty: 30 | Refills: 0 | Status: COMPLETED | COMMUNITY
Start: 2017-12-28

## 2020-02-13 PROBLEM — D25.9 LEIOMYOMA OF UTERUS, UNSPECIFIED: Chronic | Status: ACTIVE | Noted: 2018-04-21

## 2020-02-13 PROBLEM — N39.0 URINARY TRACT INFECTION, SITE NOT SPECIFIED: Chronic | Status: ACTIVE | Noted: 2018-05-07

## 2020-02-13 PROBLEM — N93.9 ABNORMAL UTERINE AND VAGINAL BLEEDING, UNSPECIFIED: Chronic | Status: ACTIVE | Noted: 2018-05-07

## 2020-02-13 PROBLEM — R97.1 ELEVATED CANCER ANTIGEN 125 [CA 125]: Chronic | Status: ACTIVE | Noted: 2018-05-07

## 2020-02-14 ENCOUNTER — APPOINTMENT (OUTPATIENT)
Dept: ORTHOPEDIC SURGERY | Facility: CLINIC | Age: 45
End: 2020-02-14
Payer: COMMERCIAL

## 2020-02-14 PROCEDURE — 73630 X-RAY EXAM OF FOOT: CPT | Mod: RT

## 2020-02-14 PROCEDURE — 99204 OFFICE O/P NEW MOD 45 MIN: CPT

## 2020-02-14 RX ORDER — NAPROXEN 500 MG/1
500 TABLET ORAL
Qty: 10 | Refills: 0 | Status: ACTIVE | COMMUNITY
Start: 2020-02-11

## 2020-02-18 NOTE — HISTORY OF PRESENT ILLNESS
[de-identified] : Patient is here for right ankle pain that began on 2/10/20 when she rolled her ankle at her house. She has some pain and swelling. she went to urgent care where xrays were taken that were negative for fracture. She was prescribed Naproxen. She has used a compression sleeve. She has been able to ambulate. She has been experiencing pins and needles sensation. Denies prior injury. \par \par The patient's past medical history, past surgical history, medications and allergies were reviewed by me today and documented accordingly. In addition, the patient's family and social history, which were noncontributory to this visit, were reviewed also. Intake form was reviewed. The patient has no family history of arthritis.

## 2020-02-18 NOTE — REASON FOR VISIT
[Initial Visit] : an initial visit for [Family Member] : family member [FreeTextEntry2] : right ankle pain

## 2020-02-18 NOTE — PHYSICAL EXAM
[de-identified] : Constitutional: Well-nourished, well-developed, No acute distress\par Respiratory:  Good respiratory effort, no SOB\par Lymphatic: No regional lymphadenopathy, no lymphedema\par Psychiatric: Pleasant and normal affect, alert and oriented x3\par Skin: Clean dry and intact B/L UE/LE\par Musculoskeletal: normal except where as noted in regional exam\par \par \par Left Foot:\par APPEARANCE: no marked deformities, no swelling or malalignment\par POSITIVE TENDERNESS: none\par PULSES: 2+ DP/PT pulses\par NONTENDER: 5th metatarsal base, cuboid, 1st MTP, dorsum & plantar surfaces, medial heel, mid heel. \par ROM: normal throughout foot, ankle, and digits. \par RESISTIVE TESTING: painless flex/ext, abd/add of all digits. \par SPECIAL TESTS:  neg Tinel's at tarsal tunnel. \par B/L Knees: No asymmetry, malalignment, or swelling, Full ROM, 5/5 strength in Flex/Ext, Joints stable\par B/L Ankles: No asymmetry, malalignment, or swelling, Full ROM, 5/5 strength in DF/PF/Inv/Ev, Joints stable\par \par \par Right Foot:\par APPEARANCE: + swelling over entire dorsum of foot, no marked deformities or malalignment\par POSITIVE TENDERNESS: + TTP sig over 3rd/4th metatarsals, mild over 2nd/5th metatarsals\par NONTENDER: 5th metatarsal base, cuboid, 1st MTP, dorsum & plantar surfaces, medial heel, mid heel. \par ROM: Limited toe flexion/extension due to stiffness/pain, otherwise normal throughout foot, ankle, and digits. \par RESISTIVE TESTING: + mild pain with flex/ext, abd/add of all digits. \par SPECIAL TESTS:  neg Tinel's at tarsal tunnel. \par NEURO: Normal sensation of LE, DTRs 2+/4 patella and achilles\par PULSES: 2+ DP/PT pulses\par  [de-identified] : The following radiographs were ordered and read by me during this patient's visit. I reviewed each radiograph in detail with the patient and discussed the findings as highlighted below. \par \par 3 views of the right foot were obtained today that show no fracture, or dislocation. There are no degenerative changes seen. There is a small calcific deposit seen over the proximal dorsum of the foot and the subcutaneous tissue in the area of the anterior tibialis, small calcaneal bone spur.  There is no malalignment. No obvious osseous abnormality. Otherwise unremarkable.\par \par

## 2020-02-18 NOTE — DISCUSSION/SUMMARY
[de-identified] : Patient was seen today for evaluation and management of acute right foot pain. Based on findings on clinical exam there is high likelihood the patient sustained a nondisplaced fracture. X-ray of the right foot does not show any evidence of acute, and certainly no evidence of a displaced fracture but this does not rule out nondisplaced fracture in near perfect alignment. Based on clinical exam and tenderness of the third/fourth metatarsals at the midpoint recommend immobilization in a short Cam Walker boot. Patient inquired whether an MRI would be able to identify more accurately if there is a metatarsal fracture, I advised the patient that it would likely be identified on MRI, however I do not believe that any surgical intervention be needed, and she could benefit from immobilization prior to imaging study. Patient would like to proceed with MRI authorization and determine if it is covered by her insurance and she would like to proceed with MRI imaging to confirm the suspected diagnosis today.  Recommend followup in 2 weeks for repeat x-ray and reevaluation, particularly if MRI is not approved or covered by her insurance.  Patient appreciates and agrees with current plan.\par \par This note was generated using dragon medical dictation software.  A reasonable effort has been made for proofreading its contents, but typos may still remain.  If there are any questions or points of clarification needed please notify my office.

## 2020-02-19 ENCOUNTER — TRANSCRIPTION ENCOUNTER (OUTPATIENT)
Age: 45
End: 2020-02-19

## 2020-02-19 ENCOUNTER — FORM ENCOUNTER (OUTPATIENT)
Age: 45
End: 2020-02-19

## 2020-02-20 ENCOUNTER — OUTPATIENT (OUTPATIENT)
Dept: OUTPATIENT SERVICES | Facility: HOSPITAL | Age: 45
LOS: 1 days | End: 2020-02-20
Payer: COMMERCIAL

## 2020-02-20 ENCOUNTER — APPOINTMENT (OUTPATIENT)
Dept: MRI IMAGING | Facility: CLINIC | Age: 45
End: 2020-02-20

## 2020-02-20 DIAGNOSIS — M77.41 METATARSALGIA, RIGHT FOOT: ICD-10-CM

## 2020-02-20 DIAGNOSIS — N61.1 ABSCESS OF THE BREAST AND NIPPLE: Chronic | ICD-10-CM

## 2020-02-20 PROCEDURE — 73718 MRI LOWER EXTREMITY W/O DYE: CPT | Mod: 26,RT

## 2020-02-20 PROCEDURE — 73718 MRI LOWER EXTREMITY W/O DYE: CPT

## 2020-03-02 ENCOUNTER — APPOINTMENT (OUTPATIENT)
Dept: ORTHOPEDIC SURGERY | Facility: CLINIC | Age: 45
End: 2020-03-02
Payer: COMMERCIAL

## 2020-03-02 DIAGNOSIS — M77.41 METATARSALGIA, RIGHT FOOT: ICD-10-CM

## 2020-03-02 DIAGNOSIS — S93.601D UNSPECIFIED SPRAIN OF RIGHT FOOT, SUBSEQUENT ENCOUNTER: ICD-10-CM

## 2020-03-02 PROCEDURE — 99213 OFFICE O/P EST LOW 20 MIN: CPT

## 2020-03-02 NOTE — DISCUSSION/SUMMARY
[de-identified] : Patient was seen today for continued management of right foot and ankle pain secondary to sprain of the tarsometatarsal joints and sprain of the ATFL. She has been able to discontinue utilizing the Cam Walker boots since last evaluation. We reviewed her MRI results which showed no evidence of fracture or significant ligamentous disruption. At this time recommend continued conservative management.  Patient will be started on a course of physical therapy to restore normal range of motion and strength as tolerated. Patient advised that she should be elevating her foot above the level of her heart and utilizing toe scrunches and ankle pumps to address the persisting swelling. Advised patient that since she will likely take another 4-8 weeks to fully resolve. We discussed return to gym activity utilizing upper extremities and seated exercises as tolerated, and will still be probably another 4 weeks at least before she can get back on cardiovascular exercise equipment on her lower extremity.  Follow up as needed.  Patient appreciates and agrees with current plan.\par \par This note was generated using dragon medical dictation software.  A reasonable effort has been made for proofreading its contents, but typos may still remain.  If there are any questions or points of clarification needed please notify my office.

## 2020-03-02 NOTE — HISTORY OF PRESENT ILLNESS
[de-identified] : Patient is here for right foot pain follow up. Her pain has decreased but she has continued to have fluctuating swelling in her foot. She is unable to wear normal footwear. She has discontinued Cam Walker boot and presents today wearing her own boot. She states that she cannot wear socks as it increases the swelling. She is not taking pain medication. She has used an ace wrap, ice, and Epsom salt to treat the swelling. There has been no recent injury.

## 2020-03-02 NOTE — PHYSICAL EXAM
[de-identified] : Constitutional: Well-nourished, well-developed, No acute distress\par Respiratory:  Good respiratory effort, no SOB\par Lymphatic: No regional lymphadenopathy, no lymphedema\par Psychiatric: Pleasant and normal affect, alert and oriented x3\par Skin: Clean dry and intact B/L UE/LE\par Musculoskeletal: normal except where as noted in regional exam\par \par Right Foot:\par APPEARANCE: + swelling over entire dorsum of foot and overlying lateral malleolus, no marked deformities or malalignment\par POSITIVE TENDERNESS: + TTP sig over 3rd/4th metatarsals, mild over 2nd/5th metatarsals\par NONTENDER: 5th metatarsal base, cuboid, 1st MTP, dorsum & plantar surfaces, medial heel, mid heel. \par ROM: Limited toe flexion/extension due to stiffness/pain, otherwise normal throughout foot, ankle, and digits. \par RESISTIVE TESTING: + mild pain with flex/ext, abd/add of all digits. \par SPECIAL TESTS:  neg Tinel's at tarsal tunnel. \par NEURO: Normal sensation of LE, DTRs 2+/4 patella and achilles\par PULSES: 2+ DP/PT pulses\par  [de-identified] : I reviewed and clinically correlated the following outside imaging studies,\par EXAM: MR FOOT RT \par \par \par PROCEDURE DATE: 02/20/2020 \par \par \par \par INTERPRETATION: \par RIGHT FOOT MRI \par \par CLINICAL INFORMATION: 4 months of mid foot swelling and pain \par TECHNIQUE: Multiplanar, multisequence MRI was obtained of the right foot. \par COMPARISON: Right foot radiograph 11/20/2015 \par \par FINDINGS: \par \par LIGAMENTS AND CAPSULAR STRUCTURES: Plantar plates are intact. . The Lisfranc \par ligament is not well assessed given scanning angles, however appears intact. \par MUSCLES AND TENDONS: There is moderate fluid encasing the medial flexor \par tendons from the level of the ankle to the midfoot, and small amount of \par fluid surrounding the peroneal tendons. There is also moderate edema/fluid \par encasing the extensor tendons in the midfoot and forefoot. \par CARTILAGE AND SUBCHONDRAL BONE: Joint spaces are relatively preserved \par possible areas of mild chondral thinning in the midfoot. \par OSSEOUS ALIGNMENT: Intact \par BONE MARROW: There is patchy edema of the lateral aspect of the navicular, \par the lateral cuboid, the anterior process of the talus and possibly the \par lateral aspect of the calcaneus. There is also multifocal edema in the toes \par involving the distal phalanx of the great toe, middle and distal phalanges \par of the second toe, distal phalanx of the third toe, middle and distal \par phalanges of the fourth and fifth toes. No definitive fractures identified. \par The calcaneus and talus are only partially visualized. \par WEB SPACES: No neuroma identified. \par PERIPHERAL SOFT TISSUES: There is diffuse dorsal predominant soft tissue \par edema. \par \par \par IMPRESSION: \par \par Multifocal bone marrow edema of the midfoot as described above, some of \par which is only partially visualized and extends into the hindfoot out of the \par field-of-view of this exam. There is also multifocal marrow edema in each of \par the toes primarily involving the middle and distal phalanges. Potential \par etiologies include biomechanical stress, underlying arthrosis and/or \par contusion/trauma, if there is supported clinical history. No fracture lines \par identified. Consider additional imaging of the hindfoot for further \par evaluation. \par \par Moderate dorsal soft tissue edema. \par \par Mild-moderate tenosynovitis of the flexor, extensor, and peroneal tendons as \par above.

## 2020-05-27 ENCOUNTER — APPOINTMENT (OUTPATIENT)
Dept: ORTHOPEDIC SURGERY | Facility: CLINIC | Age: 45
End: 2020-05-27
Payer: COMMERCIAL

## 2020-05-27 DIAGNOSIS — S93.401A SPRAIN OF UNSPECIFIED LIGAMENT OF RIGHT ANKLE, INITIAL ENCOUNTER: ICD-10-CM

## 2020-05-27 PROCEDURE — 99213 OFFICE O/P EST LOW 20 MIN: CPT

## 2020-05-27 NOTE — HISTORY OF PRESENT ILLNESS
[de-identified] : Patient is here for follow-up of right foot/ankle sprain.  Patient has persisting swelling over the area of the anterolateral ankle, she has discomfort after long walks, but no specific pain.  Patient is currently working from home due to COVID-19, she works in IT, she does primarily desk work.  She has been walking on a regular basis for exercise, she does not have any significant pain when doing this.  Patient had a very high co-pay, $50 per physical therapy visit, so she did most therapy on her own since last evaluation.  No other injury or trauma since last evaluated.

## 2020-05-27 NOTE — PHYSICAL EXAM
[de-identified] : Constitutional: Well-nourished, well-developed, No acute distress\par Respiratory:  Good respiratory effort, no SOB\par Lymphatic: No regional lymphadenopathy, no lymphedema\par Psychiatric: Pleasant and normal affect, alert and oriented x3\par Skin: Clean dry and intact B/L UE/LE\par Musculoskeletal: normal except where as noted in regional exam\par \par Right ankle:\par APPEARANCE: Mild tibiotalar joint swelling, mild discoloration of the anterolateral ankle skin, no marked deformities or malalignment\par POSITIVE TENDERNESS: None\par NONTENDER: medial malleolus, lateral malleolus, tibialis posterior tendon, achilles tendon, no marked thickening of tendon, ATFL, CFL, PTFL, anterior tibiofibular ligament (high ankle), sinus tarsus, deltoid ligaments, 5th metatarsal. \par RANGE OF MOTION: full & painless. \par RESISTIVE TESTING: painless resisted dorsiflexion, plantar flexion, inversion & eversion. \par SPECIAL TESTS: neg anterior drawer. neg talar tilt. neg Kleiger's\par NEURO: Normal sensation of LE, DTRs 2+/4 patella and achilles\par PULSES: 2+ DP/PT pulses\par

## 2020-05-27 NOTE — DISCUSSION/SUMMARY
[de-identified] : Patient was seen today for follow-up evaluation of right ankle/foot sprain.  She currently has near full range of motion, normal strength, and only has mild persistent swelling of the ankle joint.  I educated the patient she can continue to work on range of motion exercises focusing on dorsiflexion and stretching.  I advised the patient it can take upwards of 6 months for this type of intermittent swelling to finally resolve and not returned.  Recommend continued ADLs and physical activity as tolerated.  Recommend follow-up in 1 month with [facility] for further evaluation if she has persisting swelling or discomfort.  Patient appreciates and agrees with current plan.\par \par This note was generated using dragon medical dictation software.  A reasonable effort has been made for proofreading its contents, but typos may still remain.  If there are any questions or points of clarification needed please notify my office.

## 2023-01-20 NOTE — H&P PST ADULT - NSANTHOBSERVEDRD_ENT_A_CORE
OB/GYN SERVICE  Attending Post-Op Progress Note      SUBJECTIVE: Patient without complaints    OBJECTIVE      Physical    INTAKE/OUTPUT:  No intake or output data in the 24 hours ending 23 1524  TEMPERATURE:  Current - Temp: 97.9 °F (36.6 °C);  Max - Temp  Av °F (36.7 °C)  Min: 97.9 °F (36.6 °C)  Max: 98.1 °F (36.7 °C)  RESPIRATIONS RANGE: Resp  Av  Min: 16  Max: 16  PULSE RANGE: Pulse  Av.7  Min: 76  Max: 79  BLOOD PRESSURE RANGE:  Systolic (03XUS), WFD:938 , Min:123 , NTY:029  ; Diastolic (44SAQ), PCH:95, Min:70, Max:79   CONSTITUTIONAL:  awake, alert, cooperative, no apparent distress, and appears stated age  LUNGS:  No increased work of breathing, good air exchange, clear to auscultation bilaterally, no crackles or wheezing  ABDOMEN: Incision is dry and clear without drainage and normal bowel sounds  GENITAL/URINARY:  External Genitalia:  General appearance; normal, mild lochia rubra is present  Extremities: No calf tenderness +2 pedal edema  Data  CBC:   Lab Results   Component Value Date/Time    WBC 17.7 2023 05:54 AM    RBC 3.58 2023 05:54 AM    HGB 10.6 2023 05:54 AM    HCT 32.1 2023 05:54 AM    MCV 89.7 2023 05:54 AM    MCH 29.6 2023 05:54 AM    MCHC 33.0 2023 05:54 AM    RDW 13.8 2023 05:54 AM     2023 05:54 AM    MPV 10.4 2023 05:54 AM       Current Inpatient Medications    Current Facility-Administered Medications: ibuprofen (ADVIL;MOTRIN) tablet 800 mg, 800 mg, Oral, Q8H PRN  lactated ringers bolus, 1,000 mL, IntraVENous, Once  sodium chloride flush 0.9 % injection 10 mL, 10 mL, IntraVENous, 2 times per day  sodium chloride flush 0.9 % injection 10 mL, 10 mL, IntraVENous, PRN  lactated ringers infusion, , IntraVENous, Continuous  sodium chloride flush 0.9 % injection 5-40 mL, 5-40 mL, IntraVENous, 2 times per day  sodium chloride flush 0.9 % injection 5-40 mL, 5-40 mL, IntraVENous, PRN  0.9 % sodium chloride infusion, , IntraVENous, PRN  rho(D) immune globulin (HYPERRHO S/D) injection 300 mcg, 300 mcg, IntraMUSCular, Once  docusate sodium (COLACE) capsule 100 mg, 100 mg, Oral, BID  lansinoh lanolin ointment, , Topical, Q1H PRN  tetanus-diphth-acell pertussis (BOOSTRIX) injection 0.5 mL, 0.5 mL, IntraMUSCular, Prior to discharge  prenatal vitamin 27-1 MG tablet 1 tablet, 1 tablet, Oral, Daily  carboprost (HEMABATE) injection 250 mcg, 250 mcg, IntraMUSCular, PRN  methylergonovine (METHERGINE) injection 200 mcg, 200 mcg, IntraMUSCular, PRN  miSOPROStol (CYTOTEC) tablet 800 mcg, 800 mcg, Rectal, PRN  miSOPROStol (CYTOTEC) tablet 200 mcg, 200 mcg, Buccal, PRN  ferrous sulfate (IRON 325) tablet 325 mg, 325 mg, Oral, BID WC  acetaminophen (TYLENOL) tablet 650 mg, 650 mg, Oral, Q4H PRN  simethicone (MYLICON) chewable tablet 80 mg, 80 mg, Oral, Q6H PRN  bisacodyl (DULCOLAX) suppository 10 mg, 10 mg, Rectal, Daily PRN  lactated ringers bolus, 500 mL, IntraVENous, PRN **OR** lactated ringers bolus, 1,000 mL, IntraVENous, PRN  [] oxytocin (PITOCIN) 15 units in 250 mL infusion, 87.3 nehemias-units/min, IntraVENous, Continuous PRN **AND** oxytocin (PITOCIN) 10 unit bolus from the bag, 10 Units, IntraVENous, PRN  ondansetron (ZOFRAN) injection 4 mg, 4 mg, IntraVENous, Q6H PRN  butorphanol (STADOL) injection 1 mg, 1 mg, IntraVENous, Q3H PRN **AND** butorphanol (STADOL) injection 1 mg, 1 mg, IntraMUSCular, Q3H PRN  fentaNYL 1.85mcg/ml and Bupivicaine 0.1% in 0.9% NS 135ml infusion (OB) epidural, 15 mL/hr, Epidural, Continuous    ASSESSMENT AND PLAN    28 y.o. female status post  post op day #  2 progressing well  We will continue supportive care    Je Castillo MD  3:24 PM No

## 2024-02-06 NOTE — ASU PREOP CHECKLIST - BP NONINVASIVE DIASTOLIC (MM HG)
IMPRESSION:     1.  Ultrasound-guided vacuum-assisted needle core biopsy of highly suspicious mass in the left breast 1:00 position.     2.  Ultrasound guided 14-gauge needle core biopsy of suspicious lymph node in the left axilla with UMESH clip localization. The UMESH reflector probe was used to audibly detect the UMESH clip.              Exam Ended: 02/02/24  2:26 PM         New problem .denies fever, CP, SOB.   Denies depression and uncontrolled anxiety. Admits to good support systems in family, kids in the area and other states. Denies the need for ref to therapy.    84